# Patient Record
Sex: MALE | Race: WHITE | NOT HISPANIC OR LATINO | Employment: FULL TIME | URBAN - METROPOLITAN AREA
[De-identification: names, ages, dates, MRNs, and addresses within clinical notes are randomized per-mention and may not be internally consistent; named-entity substitution may affect disease eponyms.]

---

## 2024-01-18 DIAGNOSIS — E11.9 TYPE 2 DIABETES MELLITUS WITHOUT COMPLICATION, UNSPECIFIED WHETHER LONG TERM INSULIN USE (MULTI): ICD-10-CM

## 2024-01-18 DIAGNOSIS — I10 HYPERTENSION, UNSPECIFIED TYPE: ICD-10-CM

## 2024-01-22 RX ORDER — LISINOPRIL 30 MG/1
TABLET ORAL
Qty: 90 TABLET | Refills: 0 | OUTPATIENT
Start: 2024-01-22

## 2024-01-22 RX ORDER — METFORMIN HYDROCHLORIDE 500 MG/1
1000 TABLET, EXTENDED RELEASE ORAL 2 TIMES DAILY
Qty: 360 TABLET | Refills: 0 | OUTPATIENT
Start: 2024-01-22

## 2024-02-13 PROBLEM — J40 BRONCHITIS: Status: ACTIVE | Noted: 2024-02-13

## 2024-02-13 PROBLEM — I10 HTN (HYPERTENSION): Status: ACTIVE | Noted: 2024-02-13

## 2024-02-13 PROBLEM — M77.11 LATERAL EPICONDYLITIS OF RIGHT ELBOW: Status: ACTIVE | Noted: 2024-02-13

## 2024-02-13 PROBLEM — J06.9 URI WITH COUGH AND CONGESTION: Status: ACTIVE | Noted: 2024-02-13

## 2024-02-13 PROBLEM — E55.9 VITAMIN D DEFICIENCY: Status: ACTIVE | Noted: 2024-02-13

## 2024-02-13 PROBLEM — E66.9 OBESITY: Status: ACTIVE | Noted: 2024-02-13

## 2024-02-13 PROBLEM — R05.9 COUGH: Status: ACTIVE | Noted: 2024-02-13

## 2024-02-13 PROBLEM — B35.1 NAIL FUNGUS: Status: ACTIVE | Noted: 2024-02-13

## 2024-02-13 PROBLEM — E11.9 DIABETES MELLITUS, TYPE 2 (MULTI): Status: ACTIVE | Noted: 2024-02-13

## 2024-02-13 PROBLEM — R79.89 ELEVATED LFTS: Status: ACTIVE | Noted: 2024-02-13

## 2024-02-13 PROBLEM — E66.9 OBESITY WITH BODY MASS INDEX 30 OR GREATER: Status: ACTIVE | Noted: 2024-02-13

## 2024-02-13 PROBLEM — E78.1 HYPERTRIGLYCERIDEMIA: Status: ACTIVE | Noted: 2024-02-13

## 2024-02-13 PROBLEM — B34.9 NONSPECIFIC SYNDROME SUGGESTIVE OF VIRAL ILLNESS: Status: ACTIVE | Noted: 2024-02-13

## 2024-02-13 RX ORDER — ONDANSETRON 4 MG/1
8 TABLET, ORALLY DISINTEGRATING ORAL EVERY 6 HOURS PRN
COMMUNITY
Start: 2024-02-05 | End: 2024-05-13 | Stop reason: ALTCHOICE

## 2024-02-13 RX ORDER — EZETIMIBE 10 MG/1
TABLET ORAL
COMMUNITY
Start: 2023-01-19 | End: 2024-02-14 | Stop reason: SDUPTHER

## 2024-02-13 RX ORDER — GLIPIZIDE 5 MG/1
TABLET, FILM COATED, EXTENDED RELEASE ORAL
COMMUNITY
Start: 2023-01-19 | End: 2024-02-14 | Stop reason: ALTCHOICE

## 2024-02-14 ENCOUNTER — OFFICE VISIT (OUTPATIENT)
Dept: PRIMARY CARE | Facility: CLINIC | Age: 44
End: 2024-02-14
Payer: COMMERCIAL

## 2024-02-14 VITALS
WEIGHT: 247 LBS | DIASTOLIC BLOOD PRESSURE: 94 MMHG | OXYGEN SATURATION: 97 % | SYSTOLIC BLOOD PRESSURE: 148 MMHG | HEIGHT: 72 IN | BODY MASS INDEX: 33.46 KG/M2 | HEART RATE: 86 BPM

## 2024-02-14 DIAGNOSIS — E11.9 TYPE 2 DIABETES MELLITUS WITHOUT COMPLICATION, UNSPECIFIED WHETHER LONG TERM INSULIN USE (MULTI): ICD-10-CM

## 2024-02-14 DIAGNOSIS — E55.9 VITAMIN D DEFICIENCY: ICD-10-CM

## 2024-02-14 DIAGNOSIS — I10 HYPERTENSION, UNSPECIFIED TYPE: Primary | ICD-10-CM

## 2024-02-14 DIAGNOSIS — Z13.29 THYROID DISORDER SCREEN: ICD-10-CM

## 2024-02-14 DIAGNOSIS — E78.1 HYPERTRIGLYCERIDEMIA: ICD-10-CM

## 2024-02-14 DIAGNOSIS — R79.89 ELEVATED LFTS: ICD-10-CM

## 2024-02-14 DIAGNOSIS — E11.9 TYPE 2 DIABETES MELLITUS WITHOUT COMPLICATION, WITHOUT LONG-TERM CURRENT USE OF INSULIN (MULTI): ICD-10-CM

## 2024-02-14 DIAGNOSIS — R06.83 HABITUAL SNORING: ICD-10-CM

## 2024-02-14 DIAGNOSIS — Z12.11 ENCOUNTER FOR SCREENING FOR MALIGNANT NEOPLASM OF COLON: ICD-10-CM

## 2024-02-14 LAB
BASOPHILS # BLD AUTO: 0.05 X10*3/UL (ref 0–0.1)
BASOPHILS NFR BLD AUTO: 1 %
CREAT UR-MCNC: 118.3 MG/DL (ref 20–370)
EOSINOPHIL # BLD AUTO: 0.07 X10*3/UL (ref 0–0.7)
EOSINOPHIL NFR BLD AUTO: 1.4 %
ERYTHROCYTE [DISTWIDTH] IN BLOOD BY AUTOMATED COUNT: 12.5 % (ref 11.5–14.5)
EST. AVERAGE GLUCOSE BLD GHB EST-MCNC: 283 MG/DL
HBA1C MFR BLD: 11.5 %
HCT VFR BLD AUTO: 45.5 % (ref 41–52)
HGB BLD-MCNC: 15.3 G/DL (ref 13.5–17.5)
IMM GRANULOCYTES # BLD AUTO: 0.01 X10*3/UL (ref 0–0.7)
IMM GRANULOCYTES NFR BLD AUTO: 0.2 % (ref 0–0.9)
LYMPHOCYTES # BLD AUTO: 1.03 X10*3/UL (ref 1.2–4.8)
LYMPHOCYTES NFR BLD AUTO: 20.3 %
MCH RBC QN AUTO: 28.2 PG (ref 26–34)
MCHC RBC AUTO-ENTMCNC: 33.6 G/DL (ref 32–36)
MCV RBC AUTO: 84 FL (ref 80–100)
MICROALBUMIN UR-MCNC: 106.9 MG/L
MICROALBUMIN/CREAT UR: 90.4 UG/MG CREAT
MONOCYTES # BLD AUTO: 0.55 X10*3/UL (ref 0.1–1)
MONOCYTES NFR BLD AUTO: 10.8 %
NEUTROPHILS # BLD AUTO: 3.36 X10*3/UL (ref 1.2–7.7)
NEUTROPHILS NFR BLD AUTO: 66.3 %
NRBC BLD-RTO: 0 /100 WBCS (ref 0–0)
PLATELET # BLD AUTO: 218 X10*3/UL (ref 150–450)
RBC # BLD AUTO: 5.43 X10*6/UL (ref 4.5–5.9)
WBC # BLD AUTO: 5.1 X10*3/UL (ref 4.4–11.3)

## 2024-02-14 PROCEDURE — 80053 COMPREHEN METABOLIC PANEL: CPT

## 2024-02-14 PROCEDURE — 3080F DIAST BP >= 90 MM HG: CPT | Performed by: NURSE PRACTITIONER

## 2024-02-14 PROCEDURE — 82570 ASSAY OF URINE CREATININE: CPT

## 2024-02-14 PROCEDURE — 4010F ACE/ARB THERAPY RXD/TAKEN: CPT | Performed by: NURSE PRACTITIONER

## 2024-02-14 PROCEDURE — 36415 COLL VENOUS BLD VENIPUNCTURE: CPT

## 2024-02-14 PROCEDURE — 84443 ASSAY THYROID STIM HORMONE: CPT

## 2024-02-14 PROCEDURE — 86803 HEPATITIS C AB TEST: CPT

## 2024-02-14 PROCEDURE — 83550 IRON BINDING TEST: CPT

## 2024-02-14 PROCEDURE — 3077F SYST BP >= 140 MM HG: CPT | Performed by: NURSE PRACTITIONER

## 2024-02-14 PROCEDURE — 83540 ASSAY OF IRON: CPT

## 2024-02-14 PROCEDURE — 83036 HEMOGLOBIN GLYCOSYLATED A1C: CPT

## 2024-02-14 PROCEDURE — 80061 LIPID PANEL: CPT

## 2024-02-14 PROCEDURE — 3008F BODY MASS INDEX DOCD: CPT | Performed by: NURSE PRACTITIONER

## 2024-02-14 PROCEDURE — 99214 OFFICE O/P EST MOD 30 MIN: CPT | Performed by: NURSE PRACTITIONER

## 2024-02-14 PROCEDURE — 82043 UR ALBUMIN QUANTITATIVE: CPT

## 2024-02-14 PROCEDURE — 1036F TOBACCO NON-USER: CPT | Performed by: NURSE PRACTITIONER

## 2024-02-14 PROCEDURE — 85025 COMPLETE CBC W/AUTO DIFF WBC: CPT

## 2024-02-14 PROCEDURE — 82306 VITAMIN D 25 HYDROXY: CPT

## 2024-02-14 PROCEDURE — 86704 HEP B CORE ANTIBODY TOTAL: CPT

## 2024-02-14 RX ORDER — LISINOPRIL 30 MG/1
30 TABLET ORAL DAILY
Qty: 90 TABLET | Refills: 0 | Status: SHIPPED | OUTPATIENT
Start: 2024-02-14 | End: 2024-05-13 | Stop reason: SDUPTHER

## 2024-02-14 RX ORDER — METFORMIN HYDROCHLORIDE 500 MG/1
1000 TABLET, EXTENDED RELEASE ORAL 2 TIMES DAILY
Qty: 360 TABLET | Refills: 0 | Status: SHIPPED | OUTPATIENT
Start: 2024-02-14 | End: 2024-05-13 | Stop reason: SDUPTHER

## 2024-02-14 RX ORDER — EZETIMIBE 10 MG/1
10 TABLET ORAL
Qty: 90 TABLET | Refills: 0 | Status: SHIPPED | OUTPATIENT
Start: 2024-02-14 | End: 2024-05-13 | Stop reason: SDUPTHER

## 2024-02-14 RX ORDER — ASPIRIN 81 MG/1
81 TABLET ORAL DAILY
COMMUNITY

## 2024-02-14 ASSESSMENT — ENCOUNTER SYMPTOMS
RESPIRATORY NEGATIVE: 1
SLEEP DISTURBANCE: 1
NEUROLOGICAL NEGATIVE: 1
CONSTITUTIONAL NEGATIVE: 1
CARDIOVASCULAR NEGATIVE: 1

## 2024-02-14 ASSESSMENT — PAIN SCALES - GENERAL: PAINLEVEL: 0-NO PAIN

## 2024-02-14 NOTE — PATIENT INSTRUCTIONS
Tdap update at pharmacy   Lab orders in computer  Screening colonoscopy referral.  Home Sleep study ordered today

## 2024-02-14 NOTE — PROGRESS NOTES
Subjective   Patient ID: Anthony Neumann is a 43 y.o. male who presents for Follow-up (Follow up. Lov 1/19/23, Labs 1/18/23).    HPI   Patient here for routine follow up. Last office visit on 01/19/2023.  Hemoglobin A1C 9.9% 01/18/2023, did not repeat as requested. Had run out of his Metformin & Lisinopril few weeks ago, has been using his wife's regular metformin- he did vomit after using it, also had been on Medrol dose briseyda form DDS- he stopped using both (last week). Did not take Lisinopril today. Had never used Ezetimibe that was prescribed.    Current concerns: NONE  Chronic Concerns: HTN, DMT2, Elevated liver enzymes, High triglycerides, Fatty liver disease, vitamin D deficiency  Specialist- NONE  Labs  01/2023.  NON SMOKER  ETOH- rare   DIET- watches carbohydrates, portion control, Stopped soda pop  (only uses small amounts of sugar in his ice tea 1 tsp->1 container)     Review of Systems   Constitutional: Negative.    HENT:          DDS   Eyes:         Using readers.    Respiratory: Negative.     Cardiovascular: Negative.    Gastrointestinal:         Denies heart burn    Genitourinary: Negative.         Rarely up to urinate at night   Neurological: Negative.    Psychiatric/Behavioral:  Positive for sleep disturbance (snores).         Wife reports he snores.   Rare morning headaches, feels refreshed.      Objective   BP (!) 148/94 (BP Location: Right arm, Patient Position: Sitting, BP Cuff Size: Large adult)   Pulse 86   Ht 1.829 m (6')   Wt 112 kg (247 lb)   SpO2 97%   BMI 33.50 kg/m²   Weight 259 lbs 01/2023.    Physical Exam  Vitals reviewed.   Constitutional:       Appearance: He is obese.   HENT:      Right Ear: Tympanic membrane and ear canal normal.      Left Ear: Tympanic membrane and ear canal normal.   Neck:      Thyroid: No thyromegaly.      Vascular: No carotid bruit.   Cardiovascular:      Rate and Rhythm: Normal rate and regular rhythm.      Heart sounds: Normal heart sounds.   Pulmonary:       Effort: Pulmonary effort is normal.      Breath sounds: Normal breath sounds. No decreased breath sounds, wheezing or rhonchi.   Musculoskeletal:      Right lower leg: No edema.      Left lower leg: No edema.   Lymphadenopathy:      Cervical: No cervical adenopathy.   Skin:     General: Skin is warm.   Neurological:      Mental Status: He is alert.      Coordination: Coordination is intact.      Gait: Gait is intact.   Psychiatric:         Attention and Perception: Attention normal.         Mood and Affect: Mood normal.         Speech: Speech normal.         Behavior: Behavior normal. Behavior is cooperative.         Thought Content: Thought content normal.         Cognition and Memory: Cognition normal.         Judgment: Judgment normal.       Assessment/Plan   Health Maintenance  Labs - orders provided today   Tdap/Td - Advised to update at pharmacy   Influenza- previously declined  Prevnar 13/20- not indicated   Shingrix not indicated   Colonoscopy- referral provided to General Surgery   PSA - not indicated at this time  Diagnoses and all orders for this visit:  Hypertension, unspecified type  -     CBC and Auto Differential; Future  -     Comprehensive Metabolic Panel; Future  -     Albumin , Urine Random; Future  -     lisinopril 30 mg tablet; Take 1 tablet (30 mg) by mouth once daily.  Elevated LFTs  -     Comprehensive Metabolic Panel; Future  Hypertriglyceridemia- Patient had never started Zetia, plan to check lipids today and then again in 3 months after using Zetia.   -     Lipid Panel; Future  -     ezetimibe (Zetia) 10 mg tablet; Take 1 tablet (10 mg) by mouth once daily.  Type 2 diabetes mellitus without complication, without long-term current use of insulin (CMS/[facility])   Hemoglobin A1C 9.9%, discussed with patient being more aggressive with glucose control- start Semaglutide for diabetes and weight control- if insurance denies would like to consider alternative options:  Ryblesus, Trulicity or  Jardiance,   -     Hemoglobin A1C; Future  -     Albumin , Urine Random; Future  - Initialed  Semaglutide 0.25 mg or 0.5 mg (2 mg/3 mL) pen injector; Inject 0.25 mg under the skin 1 (one) time per week.  - Refilled metFORMIN  mg 24 hr tablet; Take 2 tablets (1,000 mg) by mouth 2 times a day.  Encounter for screening for malignant neoplasm of colon  -     Referral to General Surgery; Future  BMI 33.0-33.9,adult  / Habitual snoring  -     Home sleep apnea test (HSAT); Future  Vitamin D deficiency  -     Vitamin D 25-Hydroxy,Total (for eval of Vitamin D levels); Future  Thyroid disorder screen  -     TSH with reflex to Free T4 if abnormal; Future    PLAN: follow up 3 months, DM medications, review labs  Lab orders today and repeat A1C, liver enzymes,  renal function for new diabetic medication and restarting of Zetia

## 2024-02-15 DIAGNOSIS — R79.89 ELEVATED LFTS: Primary | ICD-10-CM

## 2024-02-15 LAB
25(OH)D3 SERPL-MCNC: 29 NG/ML (ref 30–100)
ALBUMIN SERPL BCP-MCNC: 4.6 G/DL (ref 3.4–5)
ALP SERPL-CCNC: 35 U/L (ref 33–120)
ALT SERPL W P-5'-P-CCNC: 145 U/L (ref 10–52)
ANION GAP SERPL CALC-SCNC: 14 MMOL/L (ref 10–20)
AST SERPL W P-5'-P-CCNC: 60 U/L (ref 9–39)
BILIRUB SERPL-MCNC: 0.7 MG/DL (ref 0–1.2)
BUN SERPL-MCNC: 16 MG/DL (ref 6–23)
CALCIUM SERPL-MCNC: 9.6 MG/DL (ref 8.6–10.6)
CHLORIDE SERPL-SCNC: 98 MMOL/L (ref 98–107)
CHOLEST SERPL-MCNC: 205 MG/DL (ref 0–199)
CHOLESTEROL/HDL RATIO: 5.1
CO2 SERPL-SCNC: 28 MMOL/L (ref 21–32)
CREAT SERPL-MCNC: 0.75 MG/DL (ref 0.5–1.3)
EGFRCR SERPLBLD CKD-EPI 2021: >90 ML/MIN/1.73M*2
GLUCOSE SERPL-MCNC: 285 MG/DL (ref 74–99)
HBV CORE AB SER QL: NONREACTIVE
HCV AB SER QL: NONREACTIVE
HDLC SERPL-MCNC: 39.9 MG/DL
IRON SATN MFR SERPL: 31 % (ref 25–45)
IRON SERPL-MCNC: 135 UG/DL (ref 35–150)
LDLC SERPL CALC-MCNC: 134 MG/DL
NON HDL CHOLESTEROL: 165 MG/DL (ref 0–149)
POTASSIUM SERPL-SCNC: 4.5 MMOL/L (ref 3.5–5.3)
PROT SERPL-MCNC: 7.1 G/DL (ref 6.4–8.2)
SODIUM SERPL-SCNC: 135 MMOL/L (ref 136–145)
TIBC SERPL-MCNC: 434 UG/DL (ref 240–445)
TRIGL SERPL-MCNC: 154 MG/DL (ref 0–149)
TSH SERPL-ACNC: 1.42 MIU/L (ref 0.44–3.98)
UIBC SERPL-MCNC: 299 UG/DL (ref 110–370)
VLDL: 31 MG/DL (ref 0–40)

## 2024-02-16 ENCOUNTER — APPOINTMENT (OUTPATIENT)
Dept: SURGERY | Facility: CLINIC | Age: 44
End: 2024-02-16
Payer: COMMERCIAL

## 2024-02-22 ENCOUNTER — APPOINTMENT (OUTPATIENT)
Dept: SURGERY | Facility: CLINIC | Age: 44
End: 2024-02-22
Payer: COMMERCIAL

## 2024-03-01 ENCOUNTER — HOSPITAL ENCOUNTER (OUTPATIENT)
Dept: RADIOLOGY | Facility: CLINIC | Age: 44
Discharge: HOME | End: 2024-03-01
Payer: COMMERCIAL

## 2024-03-01 DIAGNOSIS — R79.89 ELEVATED LFTS: ICD-10-CM

## 2024-03-01 PROCEDURE — 76705 ECHO EXAM OF ABDOMEN: CPT

## 2024-03-01 PROCEDURE — 76705 ECHO EXAM OF ABDOMEN: CPT | Performed by: STUDENT IN AN ORGANIZED HEALTH CARE EDUCATION/TRAINING PROGRAM

## 2024-03-11 ENCOUNTER — CLINICAL SUPPORT (OUTPATIENT)
Dept: SLEEP MEDICINE | Facility: HOSPITAL | Age: 44
End: 2024-03-11
Payer: COMMERCIAL

## 2024-03-11 DIAGNOSIS — R06.83 HABITUAL SNORING: ICD-10-CM

## 2024-03-11 PROCEDURE — 95806 SLEEP STUDY UNATT&RESP EFFT: CPT | Performed by: STUDENT IN AN ORGANIZED HEALTH CARE EDUCATION/TRAINING PROGRAM

## 2024-03-22 ENCOUNTER — APPOINTMENT (OUTPATIENT)
Dept: SURGERY | Facility: CLINIC | Age: 44
End: 2024-03-22
Payer: COMMERCIAL

## 2024-03-22 NOTE — RESULT ENCOUNTER NOTE
Sleep study result:  mild- moderate obstructive sleep apnea, recommend treatment with CPAP device. If interested in pursing use can order the machine from DME that is approved by insurance. What DME is preferred by insurance? We will send order for device once DME is decided.    Recommend avoidance of alcohol and other sedatives, weight loss and appropriate sleep hygiene.

## 2024-03-29 ENCOUNTER — OFFICE VISIT (OUTPATIENT)
Dept: SURGERY | Facility: CLINIC | Age: 44
End: 2024-03-29
Payer: COMMERCIAL

## 2024-03-29 VITALS
DIASTOLIC BLOOD PRESSURE: 92 MMHG | WEIGHT: 254.5 LBS | SYSTOLIC BLOOD PRESSURE: 143 MMHG | OXYGEN SATURATION: 98 % | HEART RATE: 66 BPM | BODY MASS INDEX: 34.47 KG/M2 | HEIGHT: 72 IN

## 2024-03-29 DIAGNOSIS — Z12.11 COLON CANCER SCREENING: Primary | ICD-10-CM

## 2024-03-29 PROCEDURE — 3060F POS MICROALBUMINURIA REV: CPT | Performed by: SURGERY

## 2024-03-29 PROCEDURE — 3046F HEMOGLOBIN A1C LEVEL >9.0%: CPT | Performed by: SURGERY

## 2024-03-29 PROCEDURE — 3077F SYST BP >= 140 MM HG: CPT | Performed by: SURGERY

## 2024-03-29 PROCEDURE — 3080F DIAST BP >= 90 MM HG: CPT | Performed by: SURGERY

## 2024-03-29 PROCEDURE — 4010F ACE/ARB THERAPY RXD/TAKEN: CPT | Performed by: SURGERY

## 2024-03-29 PROCEDURE — 99204 OFFICE O/P NEW MOD 45 MIN: CPT | Performed by: SURGERY

## 2024-03-29 PROCEDURE — 1036F TOBACCO NON-USER: CPT | Performed by: SURGERY

## 2024-03-29 PROCEDURE — 3008F BODY MASS INDEX DOCD: CPT | Performed by: SURGERY

## 2024-03-29 PROCEDURE — 3050F LDL-C >= 130 MG/DL: CPT | Performed by: SURGERY

## 2024-03-29 RX ORDER — SODIUM CHLORIDE 9 MG/ML
20 INJECTION, SOLUTION INTRAVENOUS CONTINUOUS
Status: CANCELLED | OUTPATIENT
Start: 2024-03-29

## 2024-03-29 RX ORDER — POLYETHYLENE GLYCOL 3350, SODIUM SULFATE ANHYDROUS, SODIUM BICARBONATE, SODIUM CHLORIDE, POTASSIUM CHLORIDE 236; 22.74; 6.74; 5.86; 2.97 G/4L; G/4L; G/4L; G/4L; G/4L
4000 POWDER, FOR SOLUTION ORAL ONCE
Qty: 4000 ML | Refills: 0 | Status: SHIPPED | OUTPATIENT
Start: 2024-03-29 | End: 2024-03-29

## 2024-03-29 ASSESSMENT — ENCOUNTER SYMPTOMS
PALPITATIONS: 0
CONSTIPATION: 0
CHILLS: 0
NAUSEA: 0
FEVER: 0
HEADACHES: 0
COUGH: 0
SHORTNESS OF BREATH: 0
VOMITING: 0
BLOOD IN STOOL: 0
DIARRHEA: 0
DIZZINESS: 0
ABDOMINAL PAIN: 0

## 2024-03-29 NOTE — PROGRESS NOTES
GENERAL SURGERY CLINIC NOTE    Anthony Neumann   1980   [id number]     History Of Present Illness  Anthony Neumann is a 43 y.o. male who presents to the office for evaluation for his first colonoscopy. He was recently diagnosed with JACQUES but has not yet received a CPAP. He recently started taking Ozempic and his injections are on Sundays.     Past Medical History  He has a past medical history of Cutaneous candidiasis, Diabetes mellitus (CMS/HCC), High triglycerides, History of chronic cough, HTN (hypertension), bronchitis, Lateral epicondylitis of right elbow, Nail fungus, Non-smoker, and Vitamin D deficiency.    Surgical History  He has a past surgical history that includes Other surgical history (02/06/2020).  No abdominal surgeries    Medications  Current Outpatient Medications on File Prior to Visit   Medication Sig Dispense Refill    aspirin 81 mg EC tablet Take 1 tablet (81 mg) by mouth once daily.      ezetimibe (Zetia) 10 mg tablet Take 1 tablet (10 mg) by mouth once daily. 90 tablet 0    GARLIC ORAL Take by mouth.      lisinopril 30 mg tablet Take 1 tablet (30 mg) by mouth once daily. 90 tablet 0    metFORMIN  mg 24 hr tablet Take 2 tablets (1,000 mg) by mouth 2 times a day. 360 tablet 0    OMEGA-3 ACID ETHYL ESTERS ORAL Take by mouth.      semaglutide 0.25 mg or 0.5 mg (2 mg/3 mL) pen injector Inject 0.25 mg under the skin 1 (one) time per week. 3 mL 1    ondansetron ODT (Zofran-ODT) 4 mg disintegrating tablet Take 2 tablets (8 mg) by mouth every 6 hours if needed.       No current facility-administered medications on file prior to visit.       Allergies  Patient has no known allergies.     Social History  He reports that he has never smoked. He has never used smokeless tobacco. He reports current alcohol use. He reports that he does not use drugs.    Family History  Family History   Problem Relation Name Age of Onset    Diabetes Other      Hypertension Other      Kidney cancer Other      Paternal grandfather with colon cancer     Review of Systems   Constitutional:  Negative for chills and fever.   Respiratory:  Negative for cough and shortness of breath.    Cardiovascular:  Negative for chest pain and palpitations.   Gastrointestinal:  Negative for abdominal pain, blood in stool, constipation, diarrhea, nausea and vomiting.   Neurological:  Negative for dizziness and headaches.   All other systems reviewed and are negative.      Last Recorded Vitals  Blood pressure (!) 156/100, pulse 66, height 1.829 m (6'), weight 115 kg (254 lb 8 oz), SpO2 98 %.     Physical Exam  Constitutional:       General: He is not in acute distress.     Appearance: Normal appearance. He is not ill-appearing.   HENT:      Head: Normocephalic and atraumatic.   Cardiovascular:      Rate and Rhythm: Normal rate and regular rhythm.   Pulmonary:      Effort: Pulmonary effort is normal. No respiratory distress.      Breath sounds: Normal breath sounds.   Abdominal:      General: There is no distension.      Palpations: Abdomen is soft.      Tenderness: There is no abdominal tenderness. There is no guarding.   Musculoskeletal:         General: No swelling.   Skin:     General: Skin is warm and dry.   Neurological:      Mental Status: He is alert and oriented to person, place, and time. Mental status is at baseline.   Psychiatric:         Mood and Affect: Mood normal.         Behavior: Behavior normal.         Assessment and Plan  43 y.o. male presenting for his first colonoscopy. The risks and benefits of undergoing colonoscopy were discussed. Risks include COVID-19 transmission/infection, allergic reactions, heart or lung complications, bleeding, infection, injury to surrounding tissue, and perforation of the colon. The patient expressed understanding and provided informed consent for the procedure.     Colonoscopy in Endoscopy 5/22/24. The Golytely prep was sent to his pharmacy. Hold the 5/19/24 dose of Ozempic.    Gia MARTINEZ  MD Pierce, FACS  General Surgery

## 2024-05-13 ENCOUNTER — OFFICE VISIT (OUTPATIENT)
Dept: PRIMARY CARE | Facility: CLINIC | Age: 44
End: 2024-05-13
Payer: COMMERCIAL

## 2024-05-13 VITALS
HEART RATE: 79 BPM | BODY MASS INDEX: 34.13 KG/M2 | SYSTOLIC BLOOD PRESSURE: 130 MMHG | OXYGEN SATURATION: 100 % | HEIGHT: 72 IN | DIASTOLIC BLOOD PRESSURE: 84 MMHG | WEIGHT: 252 LBS

## 2024-05-13 DIAGNOSIS — I10 HYPERTENSION, UNSPECIFIED TYPE: ICD-10-CM

## 2024-05-13 DIAGNOSIS — R79.89 ELEVATED LFTS: ICD-10-CM

## 2024-05-13 DIAGNOSIS — E11.9 TYPE 2 DIABETES MELLITUS WITHOUT COMPLICATION, WITHOUT LONG-TERM CURRENT USE OF INSULIN (MULTI): Primary | ICD-10-CM

## 2024-05-13 DIAGNOSIS — E78.1 HYPERTRIGLYCERIDEMIA: ICD-10-CM

## 2024-05-13 DIAGNOSIS — E55.9 VITAMIN D DEFICIENCY: ICD-10-CM

## 2024-05-13 PROCEDURE — 3075F SYST BP GE 130 - 139MM HG: CPT | Performed by: NURSE PRACTITIONER

## 2024-05-13 PROCEDURE — 99214 OFFICE O/P EST MOD 30 MIN: CPT | Performed by: NURSE PRACTITIONER

## 2024-05-13 PROCEDURE — 3060F POS MICROALBUMINURIA REV: CPT | Performed by: NURSE PRACTITIONER

## 2024-05-13 PROCEDURE — 4010F ACE/ARB THERAPY RXD/TAKEN: CPT | Performed by: NURSE PRACTITIONER

## 2024-05-13 PROCEDURE — 3008F BODY MASS INDEX DOCD: CPT | Performed by: NURSE PRACTITIONER

## 2024-05-13 PROCEDURE — 1036F TOBACCO NON-USER: CPT | Performed by: NURSE PRACTITIONER

## 2024-05-13 PROCEDURE — 3050F LDL-C >= 130 MG/DL: CPT | Performed by: NURSE PRACTITIONER

## 2024-05-13 PROCEDURE — 3046F HEMOGLOBIN A1C LEVEL >9.0%: CPT | Performed by: NURSE PRACTITIONER

## 2024-05-13 PROCEDURE — 3079F DIAST BP 80-89 MM HG: CPT | Performed by: NURSE PRACTITIONER

## 2024-05-13 RX ORDER — EZETIMIBE 10 MG/1
10 TABLET ORAL
Qty: 90 TABLET | Refills: 1 | Status: SHIPPED | OUTPATIENT
Start: 2024-05-13

## 2024-05-13 RX ORDER — LISINOPRIL 30 MG/1
30 TABLET ORAL DAILY
Qty: 90 TABLET | Refills: 1 | Status: SHIPPED | OUTPATIENT
Start: 2024-05-13

## 2024-05-13 RX ORDER — METFORMIN HYDROCHLORIDE 500 MG/1
1000 TABLET, EXTENDED RELEASE ORAL 2 TIMES DAILY
Qty: 360 TABLET | Refills: 1 | Status: SHIPPED | OUTPATIENT
Start: 2024-05-13

## 2024-05-13 ASSESSMENT — ENCOUNTER SYMPTOMS
RESPIRATORY NEGATIVE: 1
NEUROLOGICAL NEGATIVE: 1
ABDOMINAL PAIN: 0
FATIGUE: 1
CARDIOVASCULAR NEGATIVE: 1

## 2024-05-13 ASSESSMENT — PAIN SCALES - GENERAL: PAINLEVEL: 0-NO PAIN

## 2024-05-13 NOTE — PROGRESS NOTES
"Subjective   Patient ID: Anthony Neumann is a 43 y.o. male who presents for Follow-up (3m fu ).    HPI   Patient here for follow up diabetes. Last office visit on 02/14/2024  Current concerns:   1) Diabetes- initiated Semaglutide at appt in February, labs were not completed for review today. no orders to review in EMR?  Has not completed as of today appointment- initially GI problem with Semaglutide- using yogurt with good response. Blood sugars at home running 170's  Chronic Concerns: HTN, DMT2, Elevated liver enzymes, High triglycerides, Fatty liver disease, vitamin D deficiency  Specialist-  - General Surgery- colonoscopy and endoscopy scheduled with Dr Pelayo 05/22/2024.  Labs  02/2024.  NON SMOKER  ETOH- rare   DIET- watches carbohydrates, portion control, Stopped soda pop  (only uses small amounts of sugar in his ice tea 1 tsp->1 container)   [employer]- 40 minutes on Elliptical, weights (one hour) 3-4 x week.     Review of Systems   Constitutional:  Positive for fatigue (initially on Ozempic fatigue- has improved).   Respiratory: Negative.     Cardiovascular: Negative.    Gastrointestinal:  Negative for abdominal pain (initially- Ozempic, \"sulfur burps\" started yogurt (probiotic) and this helped resolve this symptom.).   Genitourinary: Negative.    Neurological: Negative.      Objective   /84 (BP Location: Right arm, Patient Position: Sitting, BP Cuff Size: Large adult)   Pulse 79   Ht 1.829 m (6')   Wt 114 kg (252 lb)   SpO2 100%   BMI 34.18 kg/m²   Weight February appt 259 lbs - 7 lb weight loss since February.   BP Readings at home 120-130's/ mid 80's.     Physical Exam  Vitals reviewed.   Constitutional:       Appearance: He is obese.   Cardiovascular:      Rate and Rhythm: Normal rate and regular rhythm.      Heart sounds: Normal heart sounds.   Pulmonary:      Effort: Pulmonary effort is normal.      Breath sounds: Normal breath sounds.   Musculoskeletal:         General: Normal range of " motion.   Skin:     General: Skin is warm.   Neurological:      General: No focal deficit present.      Mental Status: He is alert.   Psychiatric:         Mood and Affect: Mood normal.         Behavior: Behavior normal.         Judgment: Judgment normal.       Assessment/Plan   Labs - re-ordered labs to complete soon (this week)   Tdap/Td - previously had advised to update at pharmacy   Influenza- previously declined  Prevnar 13/20- not indicated   Shingrix not indicated   Colonoscopy- scheduled 05/22/2024 Dr Gia Pelayo-  General Surgery   PSA - not indicated at this time  Home Sleep Study 03/22/2024-> mild/moderate Sleep apnea patient declined CPAP device, working on weight loss, sleep hygiene.   Diagnoses and all orders for this visit:  Type 2 diabetes mellitus without complication, without long-term current use of insulin (Multi)  Not completed labs for appt today  -     CBC and Auto Differential; Future  -     Comprehensive Metabolic Panel; Future  -     Hemoglobin A1C; Future  -     Albumin , Urine Random; Future  -     Refilled  metFORMIN  mg 24 hr tablet; Take 2 tablets (1,000 mg) by mouth 2 times a day.  (90 days +1)   -     Semaglutide 0.25 mg once weekly, will increase dose to 0.50 once labs are reviewed, did not refill today awaiting labs  Hypertension, unspecified type  Readings at home reported are within recommended range, repeat today in office also acceptable.   -     CBC and Auto Differential; Future  -     Comprehensive Metabolic Panel; Future  -      Refilled lisinopril 30 mg tablet; Take 1 tablet (30 mg) by mouth once daily.   (90 days +1)  Hypertriglyceridemia  -     Lipid Panel; Future  -     Refilled ezetimibe (Zetia) 10 mg tablet; Take 1 tablet (10 mg) by mouth once daily.  (90 days+1)  Vitamin D deficiency-   Vitamin D over the counter 1000 I U had recommended he take two of the 1000 I U = 2000 I U daily (previous Vitamin D = 29) 02/14/2024  Elevated LFTs- Discussed previously weight  loss, decrease/No ETOH, avoid Tylenol etc, US liver-> fatty enlarged liver, Hep B & C non reactive.   -     Comprehensive Metabolic Panel; Future    PLAN: Follow up October  Labs in EMR to complete this week-> increase and refill Semaglutide to 0.5 mg   Communicate results and expectations for next appt through MY CHART.

## 2024-05-18 ENCOUNTER — LAB (OUTPATIENT)
Dept: LAB | Facility: LAB | Age: 44
End: 2024-05-18
Payer: COMMERCIAL

## 2024-05-18 DIAGNOSIS — E78.1 HYPERTRIGLYCERIDEMIA: ICD-10-CM

## 2024-05-18 DIAGNOSIS — R79.89 ELEVATED LFTS: ICD-10-CM

## 2024-05-18 DIAGNOSIS — E11.9 TYPE 2 DIABETES MELLITUS WITHOUT COMPLICATION, WITHOUT LONG-TERM CURRENT USE OF INSULIN (MULTI): ICD-10-CM

## 2024-05-18 DIAGNOSIS — I10 HYPERTENSION, UNSPECIFIED TYPE: ICD-10-CM

## 2024-05-18 LAB
ALBUMIN SERPL BCP-MCNC: 4.4 G/DL (ref 3.4–5)
ALP SERPL-CCNC: 31 U/L (ref 33–120)
ALT SERPL W P-5'-P-CCNC: 87 U/L (ref 10–52)
ANION GAP SERPL CALC-SCNC: 13 MMOL/L (ref 10–20)
AST SERPL W P-5'-P-CCNC: 36 U/L (ref 9–39)
BASOPHILS # BLD AUTO: 0.06 X10*3/UL (ref 0–0.1)
BASOPHILS NFR BLD AUTO: 1 %
BILIRUB SERPL-MCNC: 0.4 MG/DL (ref 0–1.2)
BUN SERPL-MCNC: 17 MG/DL (ref 6–23)
CALCIUM SERPL-MCNC: 9.2 MG/DL (ref 8.6–10.3)
CHLORIDE SERPL-SCNC: 102 MMOL/L (ref 98–107)
CHOLEST SERPL-MCNC: 142 MG/DL (ref 0–199)
CHOLESTEROL/HDL RATIO: 3.7
CO2 SERPL-SCNC: 28 MMOL/L (ref 21–32)
CREAT SERPL-MCNC: 0.76 MG/DL (ref 0.5–1.3)
CREAT UR-MCNC: 88.5 MG/DL (ref 20–370)
EGFRCR SERPLBLD CKD-EPI 2021: >90 ML/MIN/1.73M*2
EOSINOPHIL # BLD AUTO: 0.33 X10*3/UL (ref 0–0.7)
EOSINOPHIL NFR BLD AUTO: 5.3 %
ERYTHROCYTE [DISTWIDTH] IN BLOOD BY AUTOMATED COUNT: 13.1 % (ref 11.5–14.5)
EST. AVERAGE GLUCOSE BLD GHB EST-MCNC: 214 MG/DL
GLUCOSE SERPL-MCNC: 159 MG/DL (ref 74–99)
HBA1C MFR BLD: 9.1 %
HCT VFR BLD AUTO: 43.1 % (ref 41–52)
HDLC SERPL-MCNC: 38.2 MG/DL
HGB BLD-MCNC: 14.6 G/DL (ref 13.5–17.5)
IMM GRANULOCYTES # BLD AUTO: 0.01 X10*3/UL (ref 0–0.7)
IMM GRANULOCYTES NFR BLD AUTO: 0.2 % (ref 0–0.9)
LDLC SERPL CALC-MCNC: 83 MG/DL
LYMPHOCYTES # BLD AUTO: 2.05 X10*3/UL (ref 1.2–4.8)
LYMPHOCYTES NFR BLD AUTO: 32.9 %
MCH RBC QN AUTO: 28.6 PG (ref 26–34)
MCHC RBC AUTO-ENTMCNC: 33.9 G/DL (ref 32–36)
MCV RBC AUTO: 85 FL (ref 80–100)
MICROALBUMIN UR-MCNC: 30.5 MG/L
MICROALBUMIN/CREAT UR: 34.5 UG/MG CREAT
MONOCYTES # BLD AUTO: 0.46 X10*3/UL (ref 0.1–1)
MONOCYTES NFR BLD AUTO: 7.4 %
NEUTROPHILS # BLD AUTO: 3.33 X10*3/UL (ref 1.2–7.7)
NEUTROPHILS NFR BLD AUTO: 53.2 %
NON HDL CHOLESTEROL: 104 MG/DL (ref 0–149)
NRBC BLD-RTO: 0 /100 WBCS (ref 0–0)
PLATELET # BLD AUTO: 225 X10*3/UL (ref 150–450)
POTASSIUM SERPL-SCNC: 4.5 MMOL/L (ref 3.5–5.3)
PROT SERPL-MCNC: 6.6 G/DL (ref 6.4–8.2)
RBC # BLD AUTO: 5.1 X10*6/UL (ref 4.5–5.9)
SODIUM SERPL-SCNC: 138 MMOL/L (ref 136–145)
TRIGL SERPL-MCNC: 103 MG/DL (ref 0–149)
VLDL: 21 MG/DL (ref 0–40)
WBC # BLD AUTO: 6.2 X10*3/UL (ref 4.4–11.3)

## 2024-05-18 PROCEDURE — 36415 COLL VENOUS BLD VENIPUNCTURE: CPT

## 2024-05-18 PROCEDURE — 82570 ASSAY OF URINE CREATININE: CPT

## 2024-05-18 PROCEDURE — 82043 UR ALBUMIN QUANTITATIVE: CPT

## 2024-05-18 PROCEDURE — 85025 COMPLETE CBC W/AUTO DIFF WBC: CPT

## 2024-05-18 PROCEDURE — 83036 HEMOGLOBIN GLYCOSYLATED A1C: CPT

## 2024-05-18 PROCEDURE — 80053 COMPREHEN METABOLIC PANEL: CPT

## 2024-05-18 PROCEDURE — 80061 LIPID PANEL: CPT

## 2024-05-20 DIAGNOSIS — E11.9 TYPE 2 DIABETES MELLITUS WITHOUT COMPLICATION, WITHOUT LONG-TERM CURRENT USE OF INSULIN (MULTI): ICD-10-CM

## 2024-05-21 ENCOUNTER — ANESTHESIA EVENT (OUTPATIENT)
Dept: GASTROENTEROLOGY | Facility: HOSPITAL | Age: 44
End: 2024-05-21
Payer: COMMERCIAL

## 2024-05-22 ENCOUNTER — HOSPITAL ENCOUNTER (OUTPATIENT)
Dept: GASTROENTEROLOGY | Facility: HOSPITAL | Age: 44
Discharge: HOME | End: 2024-05-22
Payer: COMMERCIAL

## 2024-05-22 ENCOUNTER — ANESTHESIA (OUTPATIENT)
Dept: GASTROENTEROLOGY | Facility: HOSPITAL | Age: 44
End: 2024-05-22
Payer: COMMERCIAL

## 2024-05-22 VITALS
BODY MASS INDEX: 35 KG/M2 | DIASTOLIC BLOOD PRESSURE: 84 MMHG | OXYGEN SATURATION: 97 % | HEIGHT: 71 IN | HEART RATE: 68 BPM | RESPIRATION RATE: 16 BRPM | WEIGHT: 250 LBS | SYSTOLIC BLOOD PRESSURE: 135 MMHG | TEMPERATURE: 97.8 F

## 2024-05-22 DIAGNOSIS — Z12.11 COLON CANCER SCREENING: Primary | ICD-10-CM

## 2024-05-22 LAB — GLUCOSE BLD MANUAL STRIP-MCNC: 163 MG/DL (ref 74–99)

## 2024-05-22 PROCEDURE — 2500000004 HC RX 250 GENERAL PHARMACY W/ HCPCS (ALT 636 FOR OP/ED): Performed by: SURGERY

## 2024-05-22 PROCEDURE — 82947 ASSAY GLUCOSE BLOOD QUANT: CPT

## 2024-05-22 PROCEDURE — 3700000001 HC GENERAL ANESTHESIA TIME - INITIAL BASE CHARGE

## 2024-05-22 PROCEDURE — 3700000002 HC GENERAL ANESTHESIA TIME - EACH INCREMENTAL 1 MINUTE

## 2024-05-22 PROCEDURE — 2500000004 HC RX 250 GENERAL PHARMACY W/ HCPCS (ALT 636 FOR OP/ED): Performed by: LICENSED PRACTICAL NURSE

## 2024-05-22 PROCEDURE — 45378 DIAGNOSTIC COLONOSCOPY: CPT | Performed by: SURGERY

## 2024-05-22 PROCEDURE — 2500000005 HC RX 250 GENERAL PHARMACY W/O HCPCS: Performed by: LICENSED PRACTICAL NURSE

## 2024-05-22 PROCEDURE — 7100000010 HC PHASE TWO TIME - EACH INCREMENTAL 1 MINUTE

## 2024-05-22 PROCEDURE — 7100000009 HC PHASE TWO TIME - INITIAL BASE CHARGE

## 2024-05-22 RX ORDER — LIDOCAINE HYDROCHLORIDE 20 MG/ML
INJECTION, SOLUTION EPIDURAL; INFILTRATION; INTRACAUDAL; PERINEURAL AS NEEDED
Status: DISCONTINUED | OUTPATIENT
Start: 2024-05-22 | End: 2024-05-22

## 2024-05-22 RX ORDER — PROPOFOL 10 MG/ML
INJECTION, EMULSION INTRAVENOUS AS NEEDED
Status: DISCONTINUED | OUTPATIENT
Start: 2024-05-22 | End: 2024-05-22

## 2024-05-22 RX ORDER — SODIUM CHLORIDE 9 MG/ML
20 INJECTION, SOLUTION INTRAVENOUS CONTINUOUS
Status: DISCONTINUED | OUTPATIENT
Start: 2024-05-22 | End: 2024-05-23 | Stop reason: HOSPADM

## 2024-05-22 RX ADMIN — LIDOCAINE HYDROCHLORIDE 20 MG: 20 INJECTION, SOLUTION EPIDURAL; INFILTRATION; INTRACAUDAL; PERINEURAL at 08:09

## 2024-05-22 RX ADMIN — PROPOFOL 50 MG: 10 INJECTION, EMULSION INTRAVENOUS at 08:09

## 2024-05-22 RX ADMIN — PROPOFOL 20 MG: 10 INJECTION, EMULSION INTRAVENOUS at 08:24

## 2024-05-22 RX ADMIN — PROPOFOL 50 MG: 10 INJECTION, EMULSION INTRAVENOUS at 08:12

## 2024-05-22 RX ADMIN — PROPOFOL 50 MG: 10 INJECTION, EMULSION INTRAVENOUS at 08:16

## 2024-05-22 RX ADMIN — PROPOFOL 50 MG: 10 INJECTION, EMULSION INTRAVENOUS at 08:13

## 2024-05-22 RX ADMIN — PROPOFOL 30 MG: 10 INJECTION, EMULSION INTRAVENOUS at 08:22

## 2024-05-22 RX ADMIN — PROPOFOL 50 MG: 10 INJECTION, EMULSION INTRAVENOUS at 08:10

## 2024-05-22 RX ADMIN — SODIUM CHLORIDE 20 ML/HR: 9 INJECTION, SOLUTION INTRAVENOUS at 08:00

## 2024-05-22 RX ADMIN — PROPOFOL 50 MG: 10 INJECTION, EMULSION INTRAVENOUS at 08:19

## 2024-05-22 SDOH — HEALTH STABILITY: MENTAL HEALTH: CURRENT SMOKER: 0

## 2024-05-22 ASSESSMENT — ENCOUNTER SYMPTOMS
VOMITING: 0
CONSTIPATION: 0
HEADACHES: 0
ABDOMINAL PAIN: 0
COUGH: 0
FEVER: 0
DIZZINESS: 0
NAUSEA: 0
CHILLS: 0
BLOOD IN STOOL: 0
PALPITATIONS: 0
DIARRHEA: 0
SHORTNESS OF BREATH: 0

## 2024-05-22 ASSESSMENT — PAIN SCALES - GENERAL
PAINLEVEL_OUTOF10: 0 - NO PAIN

## 2024-05-22 ASSESSMENT — PAIN - FUNCTIONAL ASSESSMENT
PAIN_FUNCTIONAL_ASSESSMENT: 0-10

## 2024-05-22 ASSESSMENT — COLUMBIA-SUICIDE SEVERITY RATING SCALE - C-SSRS
1. IN THE PAST MONTH, HAVE YOU WISHED YOU WERE DEAD OR WISHED YOU COULD GO TO SLEEP AND NOT WAKE UP?: NO
2. HAVE YOU ACTUALLY HAD ANY THOUGHTS OF KILLING YOURSELF?: NO
6. HAVE YOU EVER DONE ANYTHING, STARTED TO DO ANYTHING, OR PREPARED TO DO ANYTHING TO END YOUR LIFE?: NO

## 2024-05-22 NOTE — H&P
GENERAL SURGERY HISTORY AND PHYSICAL    Anthony Neumann   1980   [id number]     History Of Present Illness  Anthony Neumann is a 44 y.o. male presenting for his first colonoscopy.     Past Medical History  He has a past medical history of Cutaneous candidiasis, Diabetes mellitus (Multi), High triglycerides, History of chronic cough, HTN (hypertension), bronchitis, Lateral epicondylitis of right elbow, Nail fungus, Non-smoker, Sleep apnea, and Vitamin D deficiency.    Surgical History  He has a past surgical history that includes Other surgical history (02/06/2020).    Medications  Current Outpatient Medications on File Prior to Encounter   Medication Sig Dispense Refill    aspirin 81 mg EC tablet Take 1 tablet (81 mg) by mouth once daily.      ezetimibe (Zetia) 10 mg tablet Take 1 tablet (10 mg) by mouth once daily. 90 tablet 1    GARLIC ORAL Take by mouth.      lisinopril 30 mg tablet Take 1 tablet (30 mg) by mouth once daily. 90 tablet 1    metFORMIN  mg 24 hr tablet Take 2 tablets (1,000 mg) by mouth 2 times a day. 360 tablet 1    OMEGA-3 ACID ETHYL ESTERS ORAL Take by mouth.      semaglutide 0.25 mg or 0.5 mg (2 mg/3 mL) pen injector Inject 0.5 mg under the skin 1 (one) time per week. 3 mL 5    [DISCONTINUED] semaglutide 0.25 mg or 0.5 mg (2 mg/3 mL) pen injector Inject 0.25 mg under the skin 1 (one) time per week. 3 mL 1     No current facility-administered medications on file prior to encounter.       Allergies  Patient has no known allergies.     Social History  He reports that he has never smoked. He has never used smokeless tobacco. He reports current alcohol use. He reports that he does not use drugs.    Family History  Family History   Problem Relation Name Age of Onset    Diabetes Other      Hypertension Other      Kidney cancer Other          Review of Systems   Constitutional:  Negative for chills and fever.   Respiratory:  Negative for cough and shortness of breath.    Cardiovascular:   "Negative for chest pain and palpitations.   Gastrointestinal:  Negative for abdominal pain, blood in stool, constipation, diarrhea, nausea and vomiting.   Neurological:  Negative for dizziness and headaches.   All other systems reviewed and are negative.      Last Recorded Vitals  Blood pressure 147/82, pulse 82, temperature 36.1 °C (97 °F), temperature source Temporal, resp. rate 18, height 1.803 m (5' 11\"), weight 113 kg (250 lb), SpO2 100%.     Physical Exam  Constitutional:       General: He is not in acute distress.     Appearance: Normal appearance. He is not ill-appearing.   HENT:      Head: Normocephalic and atraumatic.   Cardiovascular:      Rate and Rhythm: Normal rate and regular rhythm.   Pulmonary:      Effort: Pulmonary effort is normal. No respiratory distress.      Breath sounds: Normal breath sounds.   Abdominal:      General: There is no distension.      Palpations: Abdomen is soft.      Tenderness: There is no abdominal tenderness. There is no guarding.   Musculoskeletal:         General: No swelling.   Skin:     General: Skin is warm and dry.   Neurological:      Mental Status: He is alert and oriented to person, place, and time. Mental status is at baseline.   Psychiatric:         Mood and Affect: Mood normal.         Behavior: Behavior normal.          Relevant Results  No results found for this or any previous visit (from the past 24 hour(s)).    No results found.    Assessment and Plan  Active Problems:  There are no active Hospital Problems.    44 y.o. male presenting for his first colonoscopy.     Gia Pelayo MD, FACS  General Surgery    "

## 2024-05-22 NOTE — ANESTHESIA POSTPROCEDURE EVALUATION
Patient: Anthony Neumann    Procedure Summary       Date: 05/22/24 Room / Location: [facility]    Anesthesia Start: 0806 Anesthesia Stop: 0830    Procedure: COLONOSCOPY Diagnosis:       Colon cancer screening      Colon cancer screening    Scheduled Providers: Gia Pelayo MD Responsible Provider: RAMEZ Mora    Anesthesia Type: MAC ASA Status: 3            Anesthesia Type: MAC    Vitals Value Taken Time   /88 05/22/24 0839   Temp 36.1 °C (97 °F) 05/22/24 0829   Pulse 71 05/22/24 0839   Resp 16 05/22/24 0839   SpO2 96 % 05/22/24 0839       Anesthesia Post Evaluation    Patient location during evaluation: PACU  Patient participation: complete - patient participated  Level of consciousness: awake and alert  Pain management: adequate  Airway patency: patent  Cardiovascular status: acceptable  Respiratory status: acceptable  Hydration status: acceptable  Postoperative Nausea and Vomiting: none        There were no known notable events for this encounter.

## 2024-05-22 NOTE — ANESTHESIA PREPROCEDURE EVALUATION
Patient: Anthony Neumann    Procedure Information       Date/Time: 05/22/24 0830    Scheduled providers: Gia Pelayo MD    Procedure: COLONOSCOPY    Location:  [facility]            Relevant Problems   Anesthesia (within normal limits)      Cardiac   (+) HTN (hypertension)   (+) Hypertriglyceridemia      Liver   (+) Elevated LFTs      Endocrine   (+) Diabetes mellitus, type 2 (Multi)   (+) Obesity      ID   (+) Nail fungus   (+) URI with cough and congestion       Clinical information reviewed:   Tobacco  Allergies  Meds   Med Hx  Surg Hx   Fam Hx  Soc Hx        NPO Detail:  NPO/Void Status  Date of Last Liquid: 05/22/24  Time of Last Liquid: 0600  Date of Last Solid: 05/20/24  Time of Last Solid: 1900         Physical Exam    Airway  Mallampati: III  TM distance: <3 FB  Neck ROM: full     Cardiovascular    Dental - normal exam     Pulmonary    Abdominal            Anesthesia Plan    History of general anesthesia?: yes  History of complications of general anesthesia?: no    ASA 3     MAC     The patient is not a current smoker.    intravenous induction   Anesthetic plan and risks discussed with patient.

## 2024-05-23 NOTE — ADDENDUM NOTE
Encounter addended by: Marija Apodaca RN on: 5/23/2024 1:26 PM   Actions taken: Contacts section saved, Flowsheet accepted

## 2024-06-20 ENCOUNTER — OFFICE VISIT (OUTPATIENT)
Dept: PRIMARY CARE | Facility: CLINIC | Age: 44
End: 2024-06-20
Payer: COMMERCIAL

## 2024-06-20 VITALS
BODY MASS INDEX: 34.97 KG/M2 | HEIGHT: 71 IN | OXYGEN SATURATION: 97 % | WEIGHT: 249.8 LBS | HEART RATE: 81 BPM | SYSTOLIC BLOOD PRESSURE: 146 MMHG | DIASTOLIC BLOOD PRESSURE: 88 MMHG

## 2024-06-20 DIAGNOSIS — H65.191 ACUTE MUCOID OTITIS MEDIA OF RIGHT EAR: Primary | ICD-10-CM

## 2024-06-20 PROCEDURE — 3077F SYST BP >= 140 MM HG: CPT | Performed by: NURSE PRACTITIONER

## 2024-06-20 PROCEDURE — 3079F DIAST BP 80-89 MM HG: CPT | Performed by: NURSE PRACTITIONER

## 2024-06-20 PROCEDURE — 99213 OFFICE O/P EST LOW 20 MIN: CPT | Performed by: NURSE PRACTITIONER

## 2024-06-20 PROCEDURE — 3008F BODY MASS INDEX DOCD: CPT | Performed by: NURSE PRACTITIONER

## 2024-06-20 PROCEDURE — 3046F HEMOGLOBIN A1C LEVEL >9.0%: CPT | Performed by: NURSE PRACTITIONER

## 2024-06-20 PROCEDURE — 3048F LDL-C <100 MG/DL: CPT | Performed by: NURSE PRACTITIONER

## 2024-06-20 PROCEDURE — 3060F POS MICROALBUMINURIA REV: CPT | Performed by: NURSE PRACTITIONER

## 2024-06-20 PROCEDURE — 4010F ACE/ARB THERAPY RXD/TAKEN: CPT | Performed by: NURSE PRACTITIONER

## 2024-06-20 PROCEDURE — 1036F TOBACCO NON-USER: CPT | Performed by: NURSE PRACTITIONER

## 2024-06-20 RX ORDER — AMOXICILLIN 500 MG/1
500 CAPSULE ORAL EVERY 8 HOURS SCHEDULED
Qty: 15 CAPSULE | Refills: 0 | Status: SHIPPED | OUTPATIENT
Start: 2024-06-20 | End: 2024-06-25

## 2024-06-20 RX ORDER — FLUTICASONE PROPIONATE 50 MCG
1 SPRAY, SUSPENSION (ML) NASAL DAILY
Qty: 16 G | Refills: 1 | Status: SHIPPED | OUTPATIENT
Start: 2024-06-20 | End: 2025-06-20

## 2024-06-20 ASSESSMENT — ENCOUNTER SYMPTOMS
SINUS PAIN: 0
GASTROINTESTINAL NEGATIVE: 1
COUGH: 1
TROUBLE SWALLOWING: 0
MUSCULOSKELETAL NEGATIVE: 1
CARDIOVASCULAR NEGATIVE: 1
HEADACHES: 0
CONSTITUTIONAL NEGATIVE: 1
RHINORRHEA: 1
SINUS PRESSURE: 0

## 2024-06-20 NOTE — PROGRESS NOTES
"Subjective   Patient ID: Anthony Neumann is a 44 y.o. male who presents for Ear Drainage (No color that he could recall. ), Earache (Right side./LOV 5/13/24/Labs 5/18/24/NOV 10/3/24/Started after memorial day weekend. Was taking otc dayquil. ), and Nasal Congestion (X 1 week. Coughing a lot more mucous in the morning. ).    HPI   Patient here today for acute concerns. Last office visit on 05/13/2024  Current concern  1) Ear pain right side for last week.  Congestion started Memorial weekend- approx 3 weeks ago. Nasal congestion, Using otc Dayquil as needed. Cough only in am clear congestion.   Chronic Concerns: HTN, DMT2, Elevated liver enzymes, High triglycerides, Fatty liver disease, vitamin D deficiency  Specialist- NONE  - General Surgery- colonoscopy and endoscopy  05/22/2024.  Labs  05/18/2024.  NON SMOKER    Review of Systems   Constitutional: Negative.    HENT:  Positive for congestion (3 weeks of ongoing- improved), ear pain and rhinorrhea. Negative for ear discharge, sinus pressure, sinus pain and trouble swallowing.    Respiratory:  Positive for cough (just in morning).    Cardiovascular: Negative.    Gastrointestinal: Negative.    Musculoskeletal: Negative.    Skin: Negative.    Neurological:  Negative for headaches.       Objective   /88 (BP Location: Left arm, Patient Position: Sitting, BP Cuff Size: Large adult)   Pulse 81   Ht 1.803 m (5' 11\")   Wt 113 kg (249 lb 12.8 oz)   SpO2 97%   BMI 34.84 kg/m²     Physical Exam  Vitals reviewed.   Constitutional:       Appearance: He is obese.   HENT:      Right Ear: Swelling present. No tenderness. Tympanic membrane is bulging. Tympanic membrane is not erythematous.      Left Ear: Swelling present. A middle ear effusion is present.      Nose: Nose normal.      Mouth/Throat:      Lips: Pink.      Mouth: Mucous membranes are moist.      Pharynx: Posterior oropharyngeal erythema present. No pharyngeal swelling.   Eyes:      General: Lids are normal. "      Conjunctiva/sclera: Conjunctivae normal.   Cardiovascular:      Rate and Rhythm: Normal rate and regular rhythm.      Heart sounds: Normal heart sounds.   Pulmonary:      Effort: Pulmonary effort is normal.      Breath sounds: Normal breath sounds. No decreased breath sounds, wheezing or rhonchi.   Musculoskeletal:      Cervical back: Neck supple.   Lymphadenopathy:      Cervical: No cervical adenopathy.   Skin:     Findings: No rash.   Neurological:      General: No focal deficit present.      Mental Status: He is alert.   Psychiatric:         Attention and Perception: Attention normal.         Behavior: Behavior normal. Behavior is cooperative.       Assessment/Plan   Labs - 05/18/2024.   Tdap/Td - previously had advised to update at pharmacy   Influenza- previously declined  Prevnar 13/20- not indicated   Shingrix not indicated   Colonoscopy-  05/22/2024 repeat in 10 years (Dr Gia Pelayo-  General Surgery)   PSA - not indicated at this time  Home Sleep Study 03/22/2024-> mild/moderate Sleep apnea patient declined CPAP device, working on weight loss, sleep hygiene.     Diagnoses and all orders for this visit:  Acute mucoid otitis media of right ear  Use over the counter pain reliever as needed.   -     fluticasone (Flonase) 50 mcg/actuation nasal spray; Administer 1 spray into each nostril once daily. Shake gently. Before first use, prime pump. After use, clean tip and replace cap.  -     amoxicillin (Amoxil) 500 mg capsule; Take 1 capsule (500 mg) by mouth every 8 hours for 5 days.    PLAN: Follow up as scheduled- October/November  for routine

## 2024-10-03 ENCOUNTER — APPOINTMENT (OUTPATIENT)
Dept: PRIMARY CARE | Facility: CLINIC | Age: 44
End: 2024-10-03
Payer: COMMERCIAL

## 2024-10-03 DIAGNOSIS — E11.9 TYPE 2 DIABETES MELLITUS WITHOUT COMPLICATION, WITHOUT LONG-TERM CURRENT USE OF INSULIN (MULTI): Primary | ICD-10-CM

## 2024-10-03 DIAGNOSIS — R79.89 ELEVATED LFTS: ICD-10-CM

## 2024-10-23 ENCOUNTER — LAB (OUTPATIENT)
Dept: LAB | Facility: LAB | Age: 44
End: 2024-10-23
Payer: COMMERCIAL

## 2024-10-23 ENCOUNTER — APPOINTMENT (OUTPATIENT)
Dept: PRIMARY CARE | Facility: CLINIC | Age: 44
End: 2024-10-23
Payer: COMMERCIAL

## 2024-10-23 DIAGNOSIS — R79.89 ELEVATED LFTS: ICD-10-CM

## 2024-10-23 DIAGNOSIS — N50.89 MASS OF LEFT TESTICLE: ICD-10-CM

## 2024-10-23 DIAGNOSIS — E11.9 TYPE 2 DIABETES MELLITUS WITHOUT COMPLICATION, WITHOUT LONG-TERM CURRENT USE OF INSULIN (MULTI): ICD-10-CM

## 2024-10-23 LAB
ALBUMIN SERPL BCP-MCNC: 4.5 G/DL (ref 3.4–5)
ALP SERPL-CCNC: 30 U/L (ref 33–120)
ALT SERPL W P-5'-P-CCNC: 98 U/L (ref 10–52)
ANION GAP SERPL CALC-SCNC: 15 MMOL/L (ref 10–20)
AST SERPL W P-5'-P-CCNC: 47 U/L (ref 9–39)
BILIRUB SERPL-MCNC: 0.6 MG/DL (ref 0–1.2)
BUN SERPL-MCNC: 15 MG/DL (ref 6–23)
CALCIUM SERPL-MCNC: 9.4 MG/DL (ref 8.6–10.6)
CHLORIDE SERPL-SCNC: 101 MMOL/L (ref 98–107)
CO2 SERPL-SCNC: 26 MMOL/L (ref 21–32)
CREAT SERPL-MCNC: 0.81 MG/DL (ref 0.5–1.3)
CREAT UR-MCNC: 127.7 MG/DL (ref 20–370)
EGFRCR SERPLBLD CKD-EPI 2021: >90 ML/MIN/1.73M*2
EST. AVERAGE GLUCOSE BLD GHB EST-MCNC: 183 MG/DL
GLUCOSE SERPL-MCNC: 164 MG/DL (ref 74–99)
HBA1C MFR BLD: 8 %
MICROALBUMIN UR-MCNC: 26.2 MG/L
MICROALBUMIN/CREAT UR: 20.5 UG/MG CREAT
POTASSIUM SERPL-SCNC: 4.5 MMOL/L (ref 3.5–5.3)
PROT SERPL-MCNC: 6.9 G/DL (ref 6.4–8.2)
SODIUM SERPL-SCNC: 137 MMOL/L (ref 136–145)

## 2024-10-23 PROCEDURE — 83036 HEMOGLOBIN GLYCOSYLATED A1C: CPT

## 2024-10-23 PROCEDURE — 36415 COLL VENOUS BLD VENIPUNCTURE: CPT

## 2024-10-23 PROCEDURE — 84153 ASSAY OF PSA TOTAL: CPT

## 2024-10-23 PROCEDURE — 82043 UR ALBUMIN QUANTITATIVE: CPT

## 2024-10-23 PROCEDURE — 80053 COMPREHEN METABOLIC PANEL: CPT

## 2024-10-23 PROCEDURE — 82570 ASSAY OF URINE CREATININE: CPT

## 2024-10-25 ENCOUNTER — OFFICE VISIT (OUTPATIENT)
Dept: PRIMARY CARE | Facility: CLINIC | Age: 44
End: 2024-10-25
Payer: COMMERCIAL

## 2024-10-25 VITALS
BODY MASS INDEX: 34.41 KG/M2 | HEART RATE: 95 BPM | WEIGHT: 245.8 LBS | HEIGHT: 71 IN | OXYGEN SATURATION: 97 % | SYSTOLIC BLOOD PRESSURE: 156 MMHG | DIASTOLIC BLOOD PRESSURE: 92 MMHG

## 2024-10-25 DIAGNOSIS — I10 HYPERTENSION, UNSPECIFIED TYPE: ICD-10-CM

## 2024-10-25 DIAGNOSIS — E78.1 HYPERTRIGLYCERIDEMIA: ICD-10-CM

## 2024-10-25 DIAGNOSIS — N50.89 MASS OF LEFT TESTICLE: ICD-10-CM

## 2024-10-25 DIAGNOSIS — E55.9 VITAMIN D DEFICIENCY: ICD-10-CM

## 2024-10-25 DIAGNOSIS — E11.9 TYPE 2 DIABETES MELLITUS WITHOUT COMPLICATION, WITHOUT LONG-TERM CURRENT USE OF INSULIN (MULTI): Primary | ICD-10-CM

## 2024-10-25 DIAGNOSIS — Z13.29 THYROID DISORDER SCREEN: ICD-10-CM

## 2024-10-25 LAB — PSA SERPL-MCNC: 0.48 NG/ML

## 2024-10-25 PROCEDURE — 3061F NEG MICROALBUMINURIA REV: CPT | Performed by: NURSE PRACTITIONER

## 2024-10-25 PROCEDURE — 1036F TOBACCO NON-USER: CPT | Performed by: NURSE PRACTITIONER

## 2024-10-25 PROCEDURE — 3077F SYST BP >= 140 MM HG: CPT | Performed by: NURSE PRACTITIONER

## 2024-10-25 PROCEDURE — 99214 OFFICE O/P EST MOD 30 MIN: CPT | Performed by: NURSE PRACTITIONER

## 2024-10-25 PROCEDURE — 3080F DIAST BP >= 90 MM HG: CPT | Performed by: NURSE PRACTITIONER

## 2024-10-25 PROCEDURE — 3008F BODY MASS INDEX DOCD: CPT | Performed by: NURSE PRACTITIONER

## 2024-10-25 PROCEDURE — 4010F ACE/ARB THERAPY RXD/TAKEN: CPT | Performed by: NURSE PRACTITIONER

## 2024-10-25 PROCEDURE — 3048F LDL-C <100 MG/DL: CPT | Performed by: NURSE PRACTITIONER

## 2024-10-25 PROCEDURE — 3052F HG A1C>EQUAL 8.0%<EQUAL 9.0%: CPT | Performed by: NURSE PRACTITIONER

## 2024-10-25 RX ORDER — LISINOPRIL 30 MG/1
30 TABLET ORAL DAILY
Qty: 90 TABLET | Refills: 1 | Status: SHIPPED | OUTPATIENT
Start: 2024-10-25

## 2024-10-25 RX ORDER — METFORMIN HYDROCHLORIDE 500 MG/1
1000 TABLET, EXTENDED RELEASE ORAL 2 TIMES DAILY
Qty: 360 TABLET | Refills: 1 | Status: SHIPPED | OUTPATIENT
Start: 2024-10-25

## 2024-10-25 RX ORDER — EZETIMIBE 10 MG/1
10 TABLET ORAL
Qty: 90 TABLET | Refills: 1 | Status: SHIPPED | OUTPATIENT
Start: 2024-10-25

## 2024-10-25 ASSESSMENT — ENCOUNTER SYMPTOMS
CONSTITUTIONAL NEGATIVE: 1
HEMATURIA: 0
FLANK PAIN: 0
DYSURIA: 0
FREQUENCY: 0
DIFFICULTY URINATING: 0
NEUROLOGICAL NEGATIVE: 1
GASTROINTESTINAL NEGATIVE: 1
CARDIOVASCULAR NEGATIVE: 1
RESPIRATORY NEGATIVE: 1
MUSCULOSKELETAL NEGATIVE: 1

## 2024-10-25 NOTE — PROGRESS NOTES
"Subjective   Patient ID: Anthony Neumann is a 44 y.o. male who presents for Follow-up (Labs/Lov 6/20/24/Labs 10/23/24) and Pain (L testicle mass giving him pain within the last couple of weeks. But he thought several months ago he thought he felt a mass. After a self exam he couldn't find it again but now he can feel it and it's painful. ).    HPI   Patient here today for routine follow up. Last office visit on 06/20/2024  Current concerns:  1) left testicle- \"mass\", painful few weeks ago intermittently denies any trauma, nausea, current pain.     Working out 35 minutes majority of week- elliptical or walking, no cycling   Chronic Concerns: HTN, DMT2, Elevated liver enzymes, High triglycerides, Fatty liver disease, vitamin D deficiency  Specialist- NONE  - General Surgery- colonoscopy and endoscopy  05/22/2024.  Labs  routine 05/18/2024 repeat A1C, CMP, urine albumin, PSA 10/23/2024  NON SMOKER    Review of Systems   Constitutional: Negative.    Respiratory: Negative.     Cardiovascular: Negative.    Gastrointestinal: Negative.    Genitourinary:  Positive for testicular pain (as noted in HPI). Negative for decreased urine volume, difficulty urinating, dysuria, flank pain, frequency, genital sores, hematuria, penile discharge and urgency.   Musculoskeletal: Negative.    Skin: Negative.    Neurological: Negative.      Objective   BP (!) 156/92 (BP Location: Left arm, Patient Position: Sitting, BP Cuff Size: Large adult)   Pulse 95   Ht 1.803 m (5' 11\")   Wt 111 kg (245 lb 12.8 oz)   SpO2 97%   BMI 34.28 kg/m²   At home 's/ 70's  Weight in June 249.12 lbs   Physical Exam  Vitals reviewed.   Constitutional:       Appearance: He is obese.   Neck:      Thyroid: No thyromegaly.      Vascular: No carotid bruit.   Cardiovascular:      Rate and Rhythm: Normal rate and regular rhythm.      Heart sounds: Normal heart sounds.   Pulmonary:      Effort: Pulmonary effort is normal.      Breath sounds: Normal breath " sounds. No decreased breath sounds, wheezing, rhonchi or rales.   Abdominal:      Palpations: Abdomen is soft.   Genitourinary:     Penis: Normal.       Testes: Normal.         Right: Tenderness not present.         Left: Mass, tenderness, swelling, testicular hydrocele or varicocele not present.      Epididymis:      Right: Normal.      Left: Normal.   Musculoskeletal:      Cervical back: Neck supple.      Right lower leg: No edema.      Left lower leg: No edema.   Lymphadenopathy:      Cervical: No cervical adenopathy.   Skin:     General: Skin is warm.      Findings: No rash.   Neurological:      General: No focal deficit present.      Mental Status: He is alert.   Psychiatric:         Attention and Perception: Attention normal.         Behavior: Behavior normal.       Assessment/Plan   Labs - 10/23/2024 reviewed with patient   Tdap/Td - previously had advised to update at pharmacy   Influenza- previously declined  Prevnar 13/20- not indicated   Shingrix not indicated   Colonoscopy-  05/22/2024 repeat in 10 years (Dr Gia Pelayo-  General Surgery)   PSA - 10/25/2024  (0.48)   Home Sleep Study 03/22/2024-> mild/moderate Sleep apnea patient declined CPAP device,      Diagnoses and all orders for this visit:  Type 2 diabetes mellitus without complication, without long-term current use of insulin (Multi)  A1C 10/23/2024 8.0%   - refilled metformin  mg 24 hr tablet; Take 2 tablets (1,000 mg) by mouth 2 times a day.  -     Hemoglobin A1C; Future  - Semaglutide increase to 1 mg injection weekly, patient had significant GI side effects with increase from .50 mg-> 1.0 mg weekly, he gradually increased this dose based on side effects.   Hypertension, unspecified type  BP at home within recommended ranges - patient reports checks few times a week.   - refilled  lisinopril 30 mg tablet; Take 1 tablet (30 mg) by mouth once daily.  -     Comprehensive Metabolic Panel; Future  -     CBC and Auto Differential;  Future  Hypertriglyceridemia  - refilled   ezetimibe (Zetia) 10 mg tablet; Take 1 tablet (10 mg) by mouth once daily.  -     Lipid Panel; Future  Vitamin D deficiency  Takes daily vitamin D supplement   Mass of left testicle  Reviewed Differential Dx: epididymitis, testicular torsion, hydrocele, variocele, hernia, hematoma, spermatocele- reassured patient that today on exam no palpable mass, likely benign etiology. Will image to verify - patient agreed with plan.   -     PSA; Future- added to current lab specimen, result normal - communicated to patient via MY CHART  -     US scrotum; Future  Thyroid disorder screen  -     TSH with reflex to Free T4 if abnormal; Future    PLAN:  follow up 6 months  Labs to be completed prior to next visit.

## 2024-10-25 NOTE — PATIENT INSTRUCTIONS
Schedule US Scrotum  Labs prior to next visit- fasting 10-12 hours  Increase Semaglutide to 1 mg once weekly- increase to that dose at your pace (listen to body).

## 2024-11-05 ENCOUNTER — HOSPITAL ENCOUNTER (OUTPATIENT)
Dept: RADIOLOGY | Facility: CLINIC | Age: 44
Discharge: HOME | End: 2024-11-05
Payer: COMMERCIAL

## 2024-11-05 DIAGNOSIS — N50.89 MASS OF LEFT TESTICLE: ICD-10-CM

## 2024-11-05 PROCEDURE — 93975 VASCULAR STUDY: CPT

## 2025-04-25 ENCOUNTER — APPOINTMENT (OUTPATIENT)
Dept: PRIMARY CARE | Facility: CLINIC | Age: 45
End: 2025-04-25
Payer: COMMERCIAL

## 2025-05-09 ENCOUNTER — APPOINTMENT (OUTPATIENT)
Dept: PRIMARY CARE | Facility: CLINIC | Age: 45
End: 2025-05-09
Payer: COMMERCIAL

## 2025-05-14 LAB
ALBUMIN SERPL-MCNC: 4.4 G/DL (ref 3.6–5.1)
ALP SERPL-CCNC: 29 U/L (ref 36–130)
ALT SERPL-CCNC: 125 U/L (ref 9–46)
ANION GAP SERPL CALCULATED.4IONS-SCNC: 11 MMOL/L (CALC) (ref 7–17)
AST SERPL-CCNC: 72 U/L (ref 10–40)
BASOPHILS # BLD AUTO: 58 CELLS/UL (ref 0–200)
BASOPHILS NFR BLD AUTO: 1.1 %
BILIRUB SERPL-MCNC: 0.7 MG/DL (ref 0.2–1.2)
BUN SERPL-MCNC: 14 MG/DL (ref 7–25)
CALCIUM SERPL-MCNC: 9.2 MG/DL (ref 8.6–10.3)
CHLORIDE SERPL-SCNC: 100 MMOL/L (ref 98–110)
CHOLEST SERPL-MCNC: 154 MG/DL
CHOLEST/HDLC SERPL: 3.8 (CALC)
CO2 SERPL-SCNC: 27 MMOL/L (ref 20–32)
CREAT SERPL-MCNC: 0.79 MG/DL (ref 0.6–1.29)
EGFRCR SERPLBLD CKD-EPI 2021: 111 ML/MIN/1.73M2
EOSINOPHIL # BLD AUTO: 69 CELLS/UL (ref 15–500)
EOSINOPHIL NFR BLD AUTO: 1.3 %
ERYTHROCYTE [DISTWIDTH] IN BLOOD BY AUTOMATED COUNT: 12.9 % (ref 11–15)
EST. AVERAGE GLUCOSE BLD GHB EST-MCNC: 220 MG/DL
EST. AVERAGE GLUCOSE BLD GHB EST-SCNC: 12.2 MMOL/L
GLUCOSE SERPL-MCNC: 188 MG/DL (ref 65–99)
HBA1C MFR BLD: 9.3 %
HCT VFR BLD AUTO: 45.5 % (ref 38.5–50)
HDLC SERPL-MCNC: 41 MG/DL
HGB BLD-MCNC: 14.6 G/DL (ref 13.2–17.1)
LDLC SERPL CALC-MCNC: 90 MG/DL (CALC)
LYMPHOCYTES # BLD AUTO: 1542 CELLS/UL (ref 850–3900)
LYMPHOCYTES NFR BLD AUTO: 29.1 %
MCH RBC QN AUTO: 28.1 PG (ref 27–33)
MCHC RBC AUTO-ENTMCNC: 32.1 G/DL (ref 32–36)
MCV RBC AUTO: 87.5 FL (ref 80–100)
MONOCYTES # BLD AUTO: 419 CELLS/UL (ref 200–950)
MONOCYTES NFR BLD AUTO: 7.9 %
NEUTROPHILS # BLD AUTO: 3212 CELLS/UL (ref 1500–7800)
NEUTROPHILS NFR BLD AUTO: 60.6 %
NONHDLC SERPL-MCNC: 113 MG/DL (CALC)
PLATELET # BLD AUTO: 250 THOUSAND/UL (ref 140–400)
PMV BLD REES-ECKER: 9.6 FL (ref 7.5–12.5)
POTASSIUM SERPL-SCNC: 4.3 MMOL/L (ref 3.5–5.3)
PROT SERPL-MCNC: 6.5 G/DL (ref 6.1–8.1)
RBC # BLD AUTO: 5.2 MILLION/UL (ref 4.2–5.8)
SODIUM SERPL-SCNC: 138 MMOL/L (ref 135–146)
TRIGL SERPL-MCNC: 129 MG/DL
TSH SERPL-ACNC: 1.62 MIU/L (ref 0.4–4.5)
WBC # BLD AUTO: 5.3 THOUSAND/UL (ref 3.8–10.8)

## 2025-05-16 ENCOUNTER — APPOINTMENT (OUTPATIENT)
Dept: PRIMARY CARE | Facility: CLINIC | Age: 45
End: 2025-05-16
Payer: COMMERCIAL

## 2025-05-22 ENCOUNTER — APPOINTMENT (OUTPATIENT)
Dept: PRIMARY CARE | Facility: CLINIC | Age: 45
End: 2025-05-22
Payer: COMMERCIAL

## 2025-05-22 VITALS
HEIGHT: 71 IN | WEIGHT: 245.2 LBS | DIASTOLIC BLOOD PRESSURE: 92 MMHG | SYSTOLIC BLOOD PRESSURE: 156 MMHG | BODY MASS INDEX: 34.33 KG/M2 | OXYGEN SATURATION: 98 % | HEART RATE: 67 BPM

## 2025-05-22 DIAGNOSIS — R13.10 DYSPHAGIA, UNSPECIFIED TYPE: ICD-10-CM

## 2025-05-22 DIAGNOSIS — I10 HYPERTENSION, UNSPECIFIED TYPE: ICD-10-CM

## 2025-05-22 DIAGNOSIS — R79.89 ELEVATED LFTS: ICD-10-CM

## 2025-05-22 DIAGNOSIS — K76.0 FATTY LIVER: ICD-10-CM

## 2025-05-22 DIAGNOSIS — E78.1 HYPERTRIGLYCERIDEMIA: ICD-10-CM

## 2025-05-22 DIAGNOSIS — E11.9 TYPE 2 DIABETES MELLITUS WITHOUT COMPLICATION, WITHOUT LONG-TERM CURRENT USE OF INSULIN: Primary | ICD-10-CM

## 2025-05-22 DIAGNOSIS — K29.60 REFLUX GASTRITIS: ICD-10-CM

## 2025-05-22 PROCEDURE — 4010F ACE/ARB THERAPY RXD/TAKEN: CPT | Performed by: NURSE PRACTITIONER

## 2025-05-22 PROCEDURE — 3077F SYST BP >= 140 MM HG: CPT | Performed by: NURSE PRACTITIONER

## 2025-05-22 PROCEDURE — 1036F TOBACCO NON-USER: CPT | Performed by: NURSE PRACTITIONER

## 2025-05-22 PROCEDURE — 3080F DIAST BP >= 90 MM HG: CPT | Performed by: NURSE PRACTITIONER

## 2025-05-22 PROCEDURE — 3008F BODY MASS INDEX DOCD: CPT | Performed by: NURSE PRACTITIONER

## 2025-05-22 PROCEDURE — 99214 OFFICE O/P EST MOD 30 MIN: CPT | Performed by: NURSE PRACTITIONER

## 2025-05-22 RX ORDER — METFORMIN HYDROCHLORIDE 500 MG/1
1000 TABLET, EXTENDED RELEASE ORAL 2 TIMES DAILY
Qty: 360 TABLET | Refills: 1 | Status: SHIPPED | OUTPATIENT
Start: 2025-05-22

## 2025-05-22 RX ORDER — LISINOPRIL 30 MG/1
30 TABLET ORAL DAILY
Qty: 90 TABLET | Refills: 1 | Status: SHIPPED | OUTPATIENT
Start: 2025-05-22

## 2025-05-22 RX ORDER — PANTOPRAZOLE SODIUM 40 MG/1
40 TABLET, DELAYED RELEASE ORAL 2 TIMES DAILY
Qty: 60 TABLET | Refills: 1 | Status: SHIPPED | OUTPATIENT
Start: 2025-05-22 | End: 2025-07-21

## 2025-05-22 RX ORDER — EZETIMIBE 10 MG/1
10 TABLET ORAL
Qty: 90 TABLET | Refills: 1 | Status: SHIPPED | OUTPATIENT
Start: 2025-05-22

## 2025-05-22 ASSESSMENT — ENCOUNTER SYMPTOMS
MUSCULOSKELETAL NEGATIVE: 1
CONSTITUTIONAL NEGATIVE: 1
NEUROLOGICAL NEGATIVE: 1
CARDIOVASCULAR NEGATIVE: 1
NAUSEA: 1
ROS GI COMMENTS: AS NOTED IN HPI
RESPIRATORY NEGATIVE: 1

## 2025-05-22 NOTE — PROGRESS NOTES
"Subjective   Patient ID: Anthony Neumann is a 45 y.o. male who presents for Follow-up (6m/Lov 10/25/24/Labs 5/13/25/Pt sent a message via my chart this am on the sx he was getting while using Ozempic. ).    HPI   Patient here for follow up. Last seen on 10/25/2024  Current concerns: multiple   1) gap in Ozempic in March 2.5 week gap - side effects significant with restarting Ozempic at 1 mg per week, throat discomfort, gagging, more tired, brain fog, Nausea daily- reports also under great deal of stress with changes at his job.   2) ED  3) vision changes - no recent eye exam   Chronic Concerns: HTN, DMT2, Elevated liver enzymes, High triglycerides, Fatty liver disease, vitamin D deficiency  Specialist- NONE  - General Surgery- colonoscopy and endoscopy  05/22/2024.  Labs  05/13/2025 A1C 9.3%   (10/23/24 8.0%) elevated liver enzymes ast 72,  (10/2024 AST 47, ALT 98)  NON SMOKER  Diet- not as disciplined     Review of Systems   Constitutional: Negative.    Eyes:  Positive for visual disturbance (as noted in HPI).   Respiratory: Negative.     Cardiovascular: Negative.    Gastrointestinal:  Positive for nausea.        As noted in HPI   Genitourinary: Negative.    Musculoskeletal: Negative.    Neurological: Negative.    Psychiatric/Behavioral:          As noted in HPI     Objective   BP (!) 156/92 (BP Location: Right arm, Patient Position: Sitting, BP Cuff Size: Large adult)   Pulse 67   Ht 1.803 m (5' 11\")   Wt 111 kg (245 lb 3.2 oz)   SpO2 98%   BMI 34.20 kg/m²  BP at home well controlled, on patient phone   Weight 245 lbs in October - stable   Rechecked BP same  150's/ 90  Physical Exam  Vitals reviewed.   Constitutional:       Appearance: He is obese.   Cardiovascular:      Rate and Rhythm: Normal rate and regular rhythm.      Heart sounds: Normal heart sounds.   Pulmonary:      Effort: Pulmonary effort is normal.      Breath sounds: Normal breath sounds.   Musculoskeletal:      Cervical back: Neck " supple.   Skin:     General: Skin is warm.   Neurological:      General: No focal deficit present.      Mental Status: He is alert.   Psychiatric:         Mood and Affect: Mood normal.       Assessment/Plan   Labs -  05/13/2025 A1C 9.3%   (10/23/24 8.0%) elevated liver enzymes ast 72,  (10/2024 AST 47, ALT 98)- reviewed with patient    Tdap/Td - previously had advised to update at pharmacy   Influenza- previously declined  Prevnar 13/20- not indicated   Shingrix not indicated   Colonoscopy-  05/22/2024 repeat in 10 years (Dr Gia Pelayo-  General Surgery)   PSA - 10/25/2024  (0.48)   Home Sleep Study 03/22/2024-> mild/moderate Sleep apnea patient declined CPAP device,    US Scrotum 11/05/2024- simple left epididymal head cyst no acute scrotal pathology   US abdomen limited liver 03/01/2024-  Hepatomegaly and diffuse steatosis. No focal lesion.   Diagnoses and all orders for this visit:  Type 2 diabetes mellitus without complication, without long-term current use of insulin  Decreased dose of Semaglutide to 05. Mg weekly - keep me informed of side effects  Start checking Blood sugar at home  -     semaglutide (Ozempic) 0.25 mg or 0.5 mg(2 mg/1.5 mL) pen injector; Inject 0.5 mg under the skin 1 (one) time per week.  - refilled    metFORMIN  mg 24 hr tablet; Take 2 tablets (1,000 mg) by mouth 2 times a day.  -     Hemoglobin A1C; Future  Hypertriglyceridemia  -  refilled  ezetimibe (Zetia) 10 mg tablet; Take 1 tablet (10 mg) by mouth once daily.  Hypertension, unspecified type  Check BP at home, bring readings to next appt   -  refilled   lisinopril 30 mg tablet; Take 1 tablet (30 mg) by mouth once daily.  Dysphagia, unspecified type  / Reflux gastritis  -     FL modified barium swallow study; Future  -     Referral to Gastroenterology; Future    JOJO ESTRELLA   - Initiated  pantoprazole (ProtoNix) 40 mg EC tablet; Take 1 tablet (40 mg) by mouth 2 times a day. Do not crush, chew, or split.  Elevated LFTs   / Fatty liver  -     AST; Future  -     ALT; Future    PLAN: Follow up 3 months  Lab orders for review at next appt

## 2025-06-04 ENCOUNTER — HOSPITAL ENCOUNTER (OUTPATIENT)
Dept: RADIOLOGY | Facility: HOSPITAL | Age: 45
Discharge: HOME | End: 2025-06-04
Payer: COMMERCIAL

## 2025-06-04 DIAGNOSIS — R13.10 DYSPHAGIA, UNSPECIFIED TYPE: ICD-10-CM

## 2025-06-04 DIAGNOSIS — R13.12 OROPHARYNGEAL DYSPHAGIA: Primary | ICD-10-CM

## 2025-06-04 PROCEDURE — 92611 MOTION FLUOROSCOPY/SWALLOW: CPT | Mod: GN | Performed by: SPEECH-LANGUAGE PATHOLOGIST

## 2025-06-04 PROCEDURE — 74230 X-RAY XM SWLNG FUNCJ C+: CPT | Performed by: RADIOLOGY

## 2025-06-04 PROCEDURE — 74230 X-RAY XM SWLNG FUNCJ C+: CPT

## 2025-06-04 PROCEDURE — 2500000005 HC RX 250 GENERAL PHARMACY W/O HCPCS: Performed by: NURSE PRACTITIONER

## 2025-06-04 RX ADMIN — BARIUM SULFATE 85 ML: 400 SUSPENSION ORAL at 15:06

## 2025-06-04 RX ADMIN — BARIUM SULFATE 20 ML: 400 PASTE ORAL at 15:06

## 2025-06-04 RX ADMIN — BARIUM SULFATE 10 ML: 400 SUSPENSION ORAL at 15:06

## 2025-06-04 RX ADMIN — BARIUM SULFATE 700 MG: 700 TABLET ORAL at 15:07

## 2025-06-04 RX ADMIN — BARIUM SULFATE 100 ML: 0.81 POWDER, FOR SUSPENSION ORAL at 15:05

## 2025-06-04 NOTE — PROCEDURES
Speech-Language Pathology    Outpatient Modified Barium Swallow Study    Patient Name: Anthony Neumann  MRN: [id number]  : 1980  Today's Date: 25   Start time: 1420    End time: 1515    TECHNIQUE:  Modified Barium Swallow Study completed. Informed verbal consent obtained prior to completion of exam.     Pt given the followin trials 5ml THIN via syringe, 1 trial 20ml THIN via medicine cup, 1 trial of THIN via independent cup sip, 1 trial of THIN consecutive sips via cup/straw (~60ml = 3 sips); 1 trial 5ml NECTAR via syringe, 1 trial 20ml  NECTAR via medicine cup, 1 trial of NECTAR via independent cup sip; 2 trials 5ml each HONEY via spoon; 2 trials 5ml each PUDDING via spoon; 1 trial Soft Solids (6 cubes pears/peaches drained); 1 trial Soft Solids (5 cubes pears/peaches in liquid); 1 trial Hard Solid (½ Katie Doone cookie).     In addition the pt was given the followin Trial in an A-P View with 20ml NECTAR via medicine cup while standing with an esophageal scan.   1 Trial in an A-P View of 5ml PUDDING via spoon while standing with an esophageal scan.  1 Trial 13 mm Barium Tablet with water via cup while standing - A-P view with an esophageal scan.    A 1.9 cm or .75 inch (outer diameter) ring was placed under the chin and on the lateral, left side of the neck in order to complete objective measurements during swallowing. The anatomic structures and function of the oropharynx, larynx, hypopharynx and cervical esophagus were evaluated.      SLP: TYLOR Hernandez   Contact info: [employer] secure chat; phone: [phone]      Reason for Referral: Pt had been taking Ozempic injections on Sundays and by Wednesday he would have some issues with tightness and discomfort in his throat area when at rest. When not eating he was having c/o tightness and discomfort in his throat, but if he would drink or eat something it occasionally helped.  There was a two week gap in not taking his Ozempic and when he  started back up the symptoms returned.   He reported that the physician has lowered his Ozempic dose to see if this reduces his symptoms, but this just started.   Otherwise no difficulty eating or drinking.    Pt reports an over active gag reflex especially when brushing his teeth.     Patient Hx: HTN, HLD, DM, GERD,     Respiratory Status: Room air    Current diet: Regular diet and thin liquids    Pain:  Pain Scale: 0-10  Ratin      FINAL SPEECH RECOMMENDATIONS    DIET:   - Regular (IDDSI Level 7)  - Thin liquids (IDDSI Level 0)    Per the results of today's MBSS it is recommended pt perform the following strategies listed below:    STRATEGIES:  - Upright positioning for all PO intake  - Remain upright for >30 min after meals      SLP PLAN:  Skilled SLP Services: No further skilled SLP intervention is warranted for dysphagia at this time.  SLP Frequency: N/A  Duration: N/A  Treatment/Interventions:   - Patient/caregiver education      Discussed POC: Patient  Discussed Risks/Benefits: Yes  Patient/Caregiver Agreeable: Yes    Short term goals established 25:   N/A      Long term goals 25:   N/A    Education Provided to: Patient   SLP discussed results and recommendations of the MBS study while utilizing the MBS study video. In addition, SLP education regarding diet, diet modifications, compensatory strategies, anatomy and physiology of the swallow mechanism as well as risk for penetration and or aspiration.  Patient asked appropriate questions and SLP answered them.  Pt verbalized understanding and Meets goals/outcomes     Treatment Provided Today: No.     Additional Medical Consults Suggested:   - No new disciplines indicated    Repeat study/dc plan:   Repeat MBS study as clinically indicated.      Patient's Presentation: Pt pleasant and cooperative with assessment. Pt seated in the [place] chair in a left lateral view. Pt fed self independently Thin and Leonidas consistencies.  SLP fed pt the  remaining consistencies to control bolus size.  Pts dentition: own     Mechanics of the Swallow Summary:   ORAL PHASE:  Lip Closure - No labial escape/anterior loss of bolus   Tongue Control During Bolus Hold - Cohesive bolus between tongue to palatal seal   Bolus prep/mastication - Mastication with solid pieces of bolus unchewed d/t piecemeal deglutition that is WFL  Bolus transport/lingual motion - Brisk tongue motion for A-P movement of the bolus   Oral residue - Trace residue lining oral structures     Penetration Before the Swallow - None  Aspiration Before the Swallow - None    Additional Comments: N/A     PHARYNGEAL PHASE:  Initiation of pharyngeal swallow - Bolus head at posterior laryngeal surface of epiglottis noted for thin and nectar.  Bolus head at the ramus for all other consistencies.   Soft palate elevation - No bolus between soft palate/pharyngeal wall   Laryngeal elevation - Complete superior movement of thyroid cartilage with contact of arytenoids to epiglottic petiole   Anterior hyoid excursion - Complete anterior movement   Epiglottic movement - Complete inversion    Laryngeal vestibule closure - Complete - no air/contrast in laryngeal vestibule   Pharyngeal stripping wave - Complete  Pharyngeal contraction (A/P view) - Complete  Pharyngoesophageal segment opening - Partial distension/partial duration with partial obstruction of flow of bolus   Tongue base retraction - Trace column of contrast or air between tongue base and pharyngeal wall   Pharyngeal residue - Trace residue within or on the pharyngeal structures     Penetration During the Swallow - None  Penetration After the Swallow - None  Aspiration During the Swallow - None  Aspiration After the Swallow - None    Reflexive Response - N/A -   Cued by Clinician Response - N/A -     Compensatory Strategies Attempted - none     Additional Comments - N/A     Anatomical Abnormalities - None      ESOPHAGEAL PHASE:  Esophageal clearance - Complete  clearance        SLP Impressions with Severity Rating:   Pt exhibited a normal to min oropharyngeal dysphagia upon completion of modified barium swallow study this date. Swallowing physiology is detailed above. There are no significant impairments that are impacting swallowing safety and efficiency of the swallow.  Patient demonstrated no episodes of penetration or aspiration.  Patient demonstrated piecemeal deglutition of solids and zero to trace pharyngeal residue that was reduced with subsequent swallow.    *Of note: The A-P or Lateral bolus follow-through is not intended to be utilized as a diagnostic assessment of the esophagus, rather a tool to observe the biomechanical aspects of the swallow continuum and to inform the need for further evaluation by medical specialists, as applicable.     Images for this study are available in PACS.      OUTCOME MEASURES:  Functional Oral Intake Scale  Functional Oral Intake Scale: Level 7        total oral diet with no restrictions       Eating Assessment Tool (EAT-10)   0=No problem, 1=Mild problem, 2=Mild to moderate problem, 3=Moderate problem, 4=Severe problem    EAT 10  My swallowing problem has caused me to lose weight.: 0  My swallowing problem interferes with my ability to go out for meals.: 0  Swallowing liquids takes extra effort.: 0  Swallowing solids takes extra effort.: 0  Swallowing pills takes extra effort.: 0  Swallowing is painful: 1  The pleasure of eating is affected by my swallowing.: 0  When I swallow food sticks in my throat.: 0  I cough when I eat.: 0  Swallowing is stressful: 1  EAT-10 TOTAL SCORE:: 2    A total score of 3 or above may indicate difficulty with swallowing safely and/or efficiently      Rosenbek's Penetration Aspiration Scale  Thin Liquids: 1. NO ASPIRATION & NO PENETRATION - no aspiration, contrast does not enter airway    Jessup Thick Liquids: 1. NO ASPIRATION & NO PENETRATION - no aspiration, contrast does not enter airway    Honey  Thick Liquids: 1. NO ASPIRATION & NO PENETRATION - no aspiration, contrast does not enter airway    Pudding/Puree: 1. NO ASPIRATION & NO PENETRATION - no aspiration, contrast does not enter airway    Soft Solids: 1. NO ASPIRATION & NO PENETRATION - no aspiration, contrast does not enter airway    Hard Solids: 1. NO ASPIRATION & NO PENETRATION - no aspiration, contrast does not enter airway

## 2025-07-31 ENCOUNTER — APPOINTMENT (OUTPATIENT)
Facility: CLINIC | Age: 45
End: 2025-07-31
Payer: COMMERCIAL

## 2025-07-31 VITALS
OXYGEN SATURATION: 99 % | WEIGHT: 245 LBS | DIASTOLIC BLOOD PRESSURE: 93 MMHG | HEIGHT: 71 IN | SYSTOLIC BLOOD PRESSURE: 182 MMHG | BODY MASS INDEX: 34.3 KG/M2 | HEART RATE: 80 BPM

## 2025-07-31 DIAGNOSIS — R13.10 DYSPHAGIA, UNSPECIFIED TYPE: ICD-10-CM

## 2025-07-31 DIAGNOSIS — K29.60 REFLUX GASTRITIS: ICD-10-CM

## 2025-07-31 PROCEDURE — 99204 OFFICE O/P NEW MOD 45 MIN: CPT | Performed by: NURSE PRACTITIONER

## 2025-07-31 PROCEDURE — 3077F SYST BP >= 140 MM HG: CPT | Performed by: NURSE PRACTITIONER

## 2025-07-31 PROCEDURE — 3008F BODY MASS INDEX DOCD: CPT | Performed by: NURSE PRACTITIONER

## 2025-07-31 PROCEDURE — 1036F TOBACCO NON-USER: CPT | Performed by: NURSE PRACTITIONER

## 2025-07-31 PROCEDURE — 4010F ACE/ARB THERAPY RXD/TAKEN: CPT | Performed by: NURSE PRACTITIONER

## 2025-07-31 PROCEDURE — 3080F DIAST BP >= 90 MM HG: CPT | Performed by: NURSE PRACTITIONER

## 2025-07-31 RX ORDER — PANTOPRAZOLE SODIUM 40 MG/1
40 TABLET, DELAYED RELEASE ORAL
Qty: 30 TABLET | Refills: 1 | Status: SHIPPED | OUTPATIENT
Start: 2025-07-31 | End: 2025-09-29

## 2025-07-31 ASSESSMENT — ENCOUNTER SYMPTOMS
FEVER: 0
SHORTNESS OF BREATH: 0
ROS GI COMMENTS: SEE HPI
PALPITATIONS: 0
JOINT SWELLING: 0
CONFUSION: 0
BRUISES/BLEEDS EASILY: 0
COUGH: 0
DIFFICULTY URINATING: 0
TROUBLE SWALLOWING: 0
ADENOPATHY: 0
SORE THROAT: 0
DIZZINESS: 0
WEAKNESS: 0
WOUND: 0
ARTHRALGIAS: 0
CHILLS: 0

## 2025-07-31 NOTE — PATIENT INSTRUCTIONS
Thank you for coming to your appointment today   - decrease the pantoprazole to once daily about 30 minutes prior to breakfast   - let me know if symptoms return   - you can follow up as needed    Please call [phone] with any questions or concerns       If you utilize [employer] messages, please understand that these are intended to be used for simple and straightforward medical questions. If you have a more complex question or numerous complaints, an office appointment may be needed.

## 2025-07-31 NOTE — PROGRESS NOTES
"Subjective   Patient ID: Anthony Neumann is a 45 y.o. male with PMH of HTN, DM2, obesity who was referred by Ying Vaughn APRN-C* for Tightnes in throat.     Patient's PCP is CHRIS Alexis    HPI  Patient works as      Patient reports seeing his PCP about 2 months ago for tightness in throat and nausea. He was on a higher dose of ozempic and felt that about 3 days after he took his weekly injection, he had throat tightness and nausea for a couple of days. His ozempic dose was lowered and he was started on pantoprazole 40mg BID. With these changes, his symptoms improved. He has had 1 episode of these symptoms since the changes were made. He occasionally forgets his second dose of PPI, but feels it doesn't cause worsening symptoms when he skips that dose. He otherwise denies unintended weight loss, dysphagia, odynophagia, N/V, melena, abdominal pain, diarrhea, constipation, or rectal bleeding.     He had an MBSS 6/4/25 that was normal.       Summary of endoscopies:  - Colonoscopy 5/22/24: scattered diverticulosis in sigmoid colon      Social Hx:  Tobacco: none  Etoh: occasional   Recreational drug use: none   NSAIDs: none       Family Hx:  No IBD, pancreatitis   Paternal grandfather -- \"cancerous polyps\"     Review of Systems:  Review of Systems   Constitutional:  Negative for chills and fever.   HENT:  Negative for sore throat and trouble swallowing.    Respiratory:  Negative for cough and shortness of breath.    Cardiovascular:  Negative for chest pain and palpitations.   Gastrointestinal:         SEE HPI   Endocrine: Negative for cold intolerance and heat intolerance.   Genitourinary:  Negative for difficulty urinating.   Musculoskeletal:  Negative for arthralgias and joint swelling.   Skin:  Negative for rash and wound.   Neurological:  Negative for dizziness and weakness.   Hematological:  Negative for adenopathy. Does not bruise/bleed easily.   Psychiatric/Behavioral:  Negative for " confusion.         Medications:  Prior to Admission medications   Medication Sig Start Date End Date Taking? Authorizing Provider   aspirin 81 mg EC tablet Take 1 tablet (81 mg) by mouth once daily.    Historical Provider, MD   cholecalciferol, vitamin D3, (VITAMIN D3 ORAL) Take 2 tablets by mouth early in the morning..    Historical Provider, MD   ezetimibe (Zetia) 10 mg tablet Take 1 tablet (10 mg) by mouth once daily. 5/22/25   CHRIS Alexis   fluticasone (Flonase) 50 mcg/actuation nasal spray Administer 1 spray into each nostril once daily. Shake gently. Before first use, prime pump. After use, clean tip and replace cap.  Patient not taking: Reported on 10/25/2024 6/20/24 6/20/25  CHRIS Alexis   GARLIC ORAL Take by mouth.    Historical Provider, MD   lisinopril 30 mg tablet Take 1 tablet (30 mg) by mouth once daily. 5/22/25   CHRIS Alexis   metFORMIN  mg 24 hr tablet Take 2 tablets (1,000 mg) by mouth 2 times a day. 5/22/25   CHRIS Alexis   OMEGA-3 ACID ETHYL ESTERS ORAL Take by mouth.    Historical Provider, MD   pantoprazole (ProtoNix) 40 mg EC tablet Take 1 tablet (40 mg) by mouth 2 times a day. Do not crush, chew, or split. 5/22/25 7/21/25  CHRIS Alexis   semaglutide (Ozempic) 0.25 mg or 0.5 mg(2 mg/1.5 mL) pen injector Inject 0.5 mg under the skin 1 (one) time per week. 5/22/25   CHRIS Alexis       Allergies:  Patient has no known allergies.    Past Medical History:  He has a past medical history of Cutaneous candidiasis, Diabetes mellitus (Multi), High triglycerides, History of chronic cough, HTN (hypertension), bronchitis, Lateral epicondylitis of right elbow, Nail fungus, Non-smoker, Sleep apnea, and Vitamin D deficiency.    Past Surgical History:  He has a past surgical history that includes Other surgical history (02/06/2020).    Social History:  He reports that he has never smoked. He has never used smokeless tobacco. He  reports current alcohol use. He reports that he does not use drugs.    Objective   Physical exam:  Physical Exam  Constitutional:       General: He is not in acute distress.     Appearance: Normal appearance.   HENT:      Mouth/Throat:      Mouth: Mucous membranes are moist.     Eyes:      Conjunctiva/sclera: Conjunctivae normal.      Pupils: Pupils are equal, round, and reactive to light.       Cardiovascular:      Rate and Rhythm: Normal rate and regular rhythm.      Heart sounds: No murmur heard.  Pulmonary:      Effort: Pulmonary effort is normal.      Breath sounds: Normal breath sounds.   Abdominal:      General: Bowel sounds are normal. There is no distension.      Palpations: Abdomen is soft.      Tenderness: There is no abdominal tenderness. There is no guarding.     Skin:     General: Skin is warm and dry.      Coloration: Skin is not jaundiced.     Neurological:      Mental Status: He is alert and oriented to person, place, and time.     Psychiatric:         Mood and Affect: Mood normal.         Behavior: Behavior normal.          Assessment/Plan     Throat tightness   Previously having throat tightness that was seemingly associated with timing of ozempic injection. MBSS was normal. Symptoms improved with lower ozempic dose and BID PPI. No longer having any symptoms; will try decreasing pantoprazole 40mg down to once daily. Discussed esophagram; pt prefers to hold off for now unless symptoms return. He will follow up as needed and let me know if he has further issues.     46 minutes spent in total care of patient        ARTEM Sahu-CNP

## 2025-08-22 ENCOUNTER — APPOINTMENT (OUTPATIENT)
Dept: PRIMARY CARE | Facility: CLINIC | Age: 45
End: 2025-08-22
Payer: COMMERCIAL

## 2025-08-22 DIAGNOSIS — K76.0 FATTY LIVER: ICD-10-CM

## 2025-08-22 DIAGNOSIS — E11.9 TYPE 2 DIABETES MELLITUS WITHOUT COMPLICATION, WITHOUT LONG-TERM CURRENT USE OF INSULIN: ICD-10-CM

## 2025-08-22 DIAGNOSIS — R79.89 ELEVATED LFTS: ICD-10-CM

## 2025-09-12 ENCOUNTER — APPOINTMENT (OUTPATIENT)
Dept: PRIMARY CARE | Facility: CLINIC | Age: 45
End: 2025-09-12
Payer: COMMERCIAL